# Patient Record
Sex: FEMALE | Race: WHITE | NOT HISPANIC OR LATINO | Employment: FULL TIME | URBAN - NONMETROPOLITAN AREA
[De-identification: names, ages, dates, MRNs, and addresses within clinical notes are randomized per-mention and may not be internally consistent; named-entity substitution may affect disease eponyms.]

---

## 2023-10-06 DIAGNOSIS — R41.840 CONCENTRATION DEFICIT: ICD-10-CM

## 2023-10-06 RX ORDER — DEXTROAMPHETAMINE SULFATE, DEXTROAMPHETAMINE SACCHARATE, AMPHETAMINE SULFATE AND AMPHETAMINE ASPARTATE 7.5; 7.5; 7.5; 7.5 MG/1; MG/1; MG/1; MG/1
30 CAPSULE, EXTENDED RELEASE ORAL EVERY 24 HOURS
COMMUNITY
Start: 2023-05-31 | End: 2023-10-06 | Stop reason: SDUPTHER

## 2023-10-10 RX ORDER — DEXTROAMPHETAMINE SULFATE, DEXTROAMPHETAMINE SACCHARATE, AMPHETAMINE SULFATE AND AMPHETAMINE ASPARTATE 7.5; 7.5; 7.5; 7.5 MG/1; MG/1; MG/1; MG/1
30 CAPSULE, EXTENDED RELEASE ORAL DAILY
Qty: 30 CAPSULE | Refills: 0 | Status: SHIPPED | OUTPATIENT
Start: 2023-10-10 | End: 2023-10-16 | Stop reason: SDUPTHER

## 2023-10-16 PROBLEM — J45.909 ASTHMA (HHS-HCC): Status: ACTIVE | Noted: 2023-10-16

## 2023-10-16 PROBLEM — F41.9 ANXIETY: Status: ACTIVE | Noted: 2023-10-16

## 2023-10-16 PROBLEM — R41.840 ATTENTION AND CONCENTRATION DEFICIT: Status: ACTIVE | Noted: 2023-10-16

## 2023-10-16 PROBLEM — R20.9 SKIN SENSATION DISTURBANCE: Status: ACTIVE | Noted: 2023-10-16

## 2023-10-16 PROBLEM — D72.829 LEUKOCYTOSIS: Status: ACTIVE | Noted: 2023-10-16

## 2023-10-16 RX ORDER — OMEPRAZOLE 20 MG/1
20 TABLET, DELAYED RELEASE ORAL
COMMUNITY
End: 2024-04-16 | Stop reason: SDUPTHER

## 2023-10-16 RX ORDER — LIFITEGRAST 50 MG/ML
SOLUTION/ DROPS OPHTHALMIC
COMMUNITY
Start: 2023-03-21

## 2023-10-16 RX ORDER — ALBUTEROL SULFATE 0.83 MG/ML
SOLUTION RESPIRATORY (INHALATION)
COMMUNITY

## 2023-10-16 RX ORDER — FLUOXETINE 10 MG/1
10 CAPSULE ORAL DAILY
COMMUNITY
Start: 2018-01-08

## 2023-10-17 RX ORDER — DEXTROAMPHETAMINE SACCHARATE, AMPHETAMINE ASPARTATE MONOHYDRATE, DEXTROAMPHETAMINE SULFATE AND AMPHETAMINE SULFATE 7.5; 7.5; 7.5; 7.5 MG/1; MG/1; MG/1; MG/1
30 CAPSULE, EXTENDED RELEASE ORAL DAILY
Qty: 30 CAPSULE | Refills: 0 | Status: SHIPPED | OUTPATIENT
Start: 2023-10-17 | End: 2023-12-27 | Stop reason: SDUPTHER

## 2023-12-27 ENCOUNTER — TELEPHONE (OUTPATIENT)
Dept: PRIMARY CARE | Facility: CLINIC | Age: 53
End: 2023-12-27

## 2023-12-27 DIAGNOSIS — R41.840 CONCENTRATION DEFICIT: ICD-10-CM

## 2024-01-02 RX ORDER — DEXTROAMPHETAMINE SACCHARATE, AMPHETAMINE ASPARTATE MONOHYDRATE, DEXTROAMPHETAMINE SULFATE AND AMPHETAMINE SULFATE 7.5; 7.5; 7.5; 7.5 MG/1; MG/1; MG/1; MG/1
30 CAPSULE, EXTENDED RELEASE ORAL DAILY
Qty: 30 CAPSULE | Refills: 0 | Status: SHIPPED | OUTPATIENT
Start: 2024-01-02 | End: 2024-02-08 | Stop reason: SDUPTHER

## 2024-02-08 DIAGNOSIS — R41.840 CONCENTRATION DEFICIT: ICD-10-CM

## 2024-02-14 RX ORDER — DEXTROAMPHETAMINE SACCHARATE, AMPHETAMINE ASPARTATE MONOHYDRATE, DEXTROAMPHETAMINE SULFATE AND AMPHETAMINE SULFATE 7.5; 7.5; 7.5; 7.5 MG/1; MG/1; MG/1; MG/1
30 CAPSULE, EXTENDED RELEASE ORAL DAILY
Qty: 30 CAPSULE | Refills: 0 | Status: SHIPPED | OUTPATIENT
Start: 2024-02-14 | End: 2024-03-14 | Stop reason: SDUPTHER

## 2024-03-14 DIAGNOSIS — R41.840 CONCENTRATION DEFICIT: ICD-10-CM

## 2024-03-14 RX ORDER — DEXTROAMPHETAMINE SACCHARATE, AMPHETAMINE ASPARTATE MONOHYDRATE, DEXTROAMPHETAMINE SULFATE AND AMPHETAMINE SULFATE 7.5; 7.5; 7.5; 7.5 MG/1; MG/1; MG/1; MG/1
30 CAPSULE, EXTENDED RELEASE ORAL DAILY
Qty: 30 CAPSULE | Refills: 0 | Status: SHIPPED | OUTPATIENT
Start: 2024-03-14 | End: 2024-04-16 | Stop reason: SDUPTHER

## 2024-04-15 ENCOUNTER — APPOINTMENT (OUTPATIENT)
Dept: PRIMARY CARE | Facility: CLINIC | Age: 54
End: 2024-04-15
Payer: COMMERCIAL

## 2024-04-16 ENCOUNTER — LAB (OUTPATIENT)
Dept: LAB | Facility: LAB | Age: 54
End: 2024-04-16
Payer: COMMERCIAL

## 2024-04-16 ENCOUNTER — OFFICE VISIT (OUTPATIENT)
Dept: PRIMARY CARE | Facility: CLINIC | Age: 54
End: 2024-04-16
Payer: COMMERCIAL

## 2024-04-16 VITALS
SYSTOLIC BLOOD PRESSURE: 130 MMHG | WEIGHT: 184.4 LBS | OXYGEN SATURATION: 98 % | BODY MASS INDEX: 27.31 KG/M2 | DIASTOLIC BLOOD PRESSURE: 78 MMHG | HEART RATE: 88 BPM | TEMPERATURE: 98.2 F | HEIGHT: 69 IN

## 2024-04-16 DIAGNOSIS — Z01.818 PREOP EXAMINATION: ICD-10-CM

## 2024-04-16 DIAGNOSIS — K21.9 GASTROESOPHAGEAL REFLUX DISEASE WITHOUT ESOPHAGITIS: Primary | ICD-10-CM

## 2024-04-16 DIAGNOSIS — K21.9 GASTROESOPHAGEAL REFLUX DISEASE WITHOUT ESOPHAGITIS: ICD-10-CM

## 2024-04-16 DIAGNOSIS — R41.840 CONCENTRATION DEFICIT: ICD-10-CM

## 2024-04-16 LAB
ALBUMIN SERPL BCP-MCNC: 4.7 G/DL (ref 3.4–5)
ALP SERPL-CCNC: 101 U/L (ref 33–110)
ALT SERPL W P-5'-P-CCNC: 34 U/L (ref 7–45)
ANION GAP SERPL CALC-SCNC: 12 MMOL/L (ref 10–20)
AST SERPL W P-5'-P-CCNC: 28 U/L (ref 9–39)
BASOPHILS # BLD AUTO: 0.05 X10*3/UL (ref 0–0.1)
BASOPHILS NFR BLD AUTO: 0.6 %
BILIRUB SERPL-MCNC: 0.3 MG/DL (ref 0–1.2)
BUN SERPL-MCNC: 12 MG/DL (ref 6–23)
CALCIUM SERPL-MCNC: 10.3 MG/DL (ref 8.6–10.3)
CHLORIDE SERPL-SCNC: 99 MMOL/L (ref 98–107)
CO2 SERPL-SCNC: 32 MMOL/L (ref 21–32)
CREAT SERPL-MCNC: 0.82 MG/DL (ref 0.5–1.05)
EGFRCR SERPLBLD CKD-EPI 2021: 87 ML/MIN/1.73M*2
EOSINOPHIL # BLD AUTO: 0.09 X10*3/UL (ref 0–0.7)
EOSINOPHIL NFR BLD AUTO: 1.1 %
ERYTHROCYTE [DISTWIDTH] IN BLOOD BY AUTOMATED COUNT: 13.3 % (ref 11.5–14.5)
GLUCOSE SERPL-MCNC: 79 MG/DL (ref 74–99)
HCT VFR BLD AUTO: 42.1 % (ref 36–46)
HGB BLD-MCNC: 14.1 G/DL (ref 12–16)
IMM GRANULOCYTES # BLD AUTO: 0.05 X10*3/UL (ref 0–0.7)
IMM GRANULOCYTES NFR BLD AUTO: 0.6 % (ref 0–0.9)
LYMPHOCYTES # BLD AUTO: 3.07 X10*3/UL (ref 1.2–4.8)
LYMPHOCYTES NFR BLD AUTO: 37.1 %
MCH RBC QN AUTO: 31.8 PG (ref 26–34)
MCHC RBC AUTO-ENTMCNC: 33.5 G/DL (ref 32–36)
MCV RBC AUTO: 95 FL (ref 80–100)
MONOCYTES # BLD AUTO: 0.8 X10*3/UL (ref 0.1–1)
MONOCYTES NFR BLD AUTO: 9.7 %
NEUTROPHILS # BLD AUTO: 4.21 X10*3/UL (ref 1.2–7.7)
NEUTROPHILS NFR BLD AUTO: 50.9 %
NRBC BLD-RTO: 0 /100 WBCS (ref 0–0)
PLATELET # BLD AUTO: 311 X10*3/UL (ref 150–450)
POTASSIUM SERPL-SCNC: 4.5 MMOL/L (ref 3.5–5.3)
PROT SERPL-MCNC: 7.3 G/DL (ref 6.4–8.2)
RBC # BLD AUTO: 4.44 X10*6/UL (ref 4–5.2)
SODIUM SERPL-SCNC: 138 MMOL/L (ref 136–145)
WBC # BLD AUTO: 8.3 X10*3/UL (ref 4.4–11.3)

## 2024-04-16 PROCEDURE — 36415 COLL VENOUS BLD VENIPUNCTURE: CPT

## 2024-04-16 PROCEDURE — 99214 OFFICE O/P EST MOD 30 MIN: CPT | Performed by: FAMILY MEDICINE

## 2024-04-16 RX ORDER — DEXTROAMPHETAMINE SACCHARATE, AMPHETAMINE ASPARTATE MONOHYDRATE, DEXTROAMPHETAMINE SULFATE AND AMPHETAMINE SULFATE 7.5; 7.5; 7.5; 7.5 MG/1; MG/1; MG/1; MG/1
30 CAPSULE, EXTENDED RELEASE ORAL DAILY
Qty: 30 CAPSULE | Refills: 0 | Status: SHIPPED | OUTPATIENT
Start: 2024-04-16 | End: 2024-05-16

## 2024-04-16 RX ORDER — DEXTROAMPHETAMINE SACCHARATE, AMPHETAMINE ASPARTATE MONOHYDRATE, DEXTROAMPHETAMINE SULFATE AND AMPHETAMINE SULFATE 7.5; 7.5; 7.5; 7.5 MG/1; MG/1; MG/1; MG/1
30 CAPSULE, EXTENDED RELEASE ORAL DAILY
Qty: 30 CAPSULE | Refills: 0 | Status: SHIPPED | OUTPATIENT
Start: 2024-05-16 | End: 2024-06-15

## 2024-04-16 RX ORDER — OMEPRAZOLE 20 MG/1
20 TABLET, DELAYED RELEASE ORAL DAILY PRN
Qty: 90 TABLET | Refills: 1 | Status: SHIPPED | OUTPATIENT
Start: 2024-04-16

## 2024-04-16 RX ORDER — DEXTROAMPHETAMINE SACCHARATE, AMPHETAMINE ASPARTATE MONOHYDRATE, DEXTROAMPHETAMINE SULFATE AND AMPHETAMINE SULFATE 7.5; 7.5; 7.5; 7.5 MG/1; MG/1; MG/1; MG/1
30 CAPSULE, EXTENDED RELEASE ORAL DAILY
Qty: 30 CAPSULE | Refills: 0 | Status: SHIPPED | OUTPATIENT
Start: 2024-06-15 | End: 2024-07-15

## 2024-04-16 RX ORDER — FAMOTIDINE 40 MG/1
40 TABLET, FILM COATED ORAL DAILY PRN
Qty: 90 TABLET | Refills: 3 | Status: SHIPPED | OUTPATIENT
Start: 2024-04-16

## 2024-04-16 ASSESSMENT — PATIENT HEALTH QUESTIONNAIRE - PHQ9
2. FEELING DOWN, DEPRESSED OR HOPELESS: NOT AT ALL
1. LITTLE INTEREST OR PLEASURE IN DOING THINGS: NOT AT ALL
SUM OF ALL RESPONSES TO PHQ9 QUESTIONS 1 AND 2: 0

## 2024-04-16 ASSESSMENT — PAIN SCALES - GENERAL: PAINLEVEL: 4

## 2024-05-20 ENCOUNTER — TELEPHONE (OUTPATIENT)
Dept: PRIMARY CARE | Facility: CLINIC | Age: 54
End: 2024-05-20
Payer: COMMERCIAL

## 2024-05-20 NOTE — TELEPHONE ENCOUNTER
patient called for refilled was told to check with pharm patient should still have a refill avail this message created in error

## 2024-06-19 RX ORDER — HYDROCODONE BITARTRATE AND ACETAMINOPHEN 5; 325 MG/1; MG/1
1 TABLET ORAL EVERY 6 HOURS PRN
COMMUNITY
Start: 2024-04-25

## 2024-06-19 RX ORDER — OMEPRAZOLE 20 MG/1
20 CAPSULE, DELAYED RELEASE ORAL
COMMUNITY
Start: 2024-04-16

## 2024-06-19 ASSESSMENT — ENCOUNTER SYMPTOMS
ENDOCRINE NEGATIVE: 1
SHORTNESS OF BREATH: 0
DIARRHEA: 0
DIZZINESS: 0
NAUSEA: 0
DIFFICULTY URINATING: 0
FEVER: 0

## 2024-06-19 NOTE — PROGRESS NOTES
"Subjective   Patient ID: Angélica Daniel is a 53 y.o. female who presents for surgery clearance (Left thumb 4/26 ) and Anxiety (Discuss meds).    Surgery clearance-left thumb on 04/26/2024  Anxiety    Anxiety  Patient reports no chest pain, dizziness, nausea, shortness of breath or suicidal ideas.            Review of Systems   Constitutional:  Negative for fever.        Also see HPI   Eyes:  Negative for visual disturbance.   Respiratory:  Negative for shortness of breath.    Cardiovascular:  Negative for chest pain.   Gastrointestinal:  Negative for diarrhea and nausea.   Endocrine: Negative.    Genitourinary:  Negative for difficulty urinating.   Skin:  Negative for rash.   Neurological:  Negative for dizziness.        No focal deficits   Psychiatric/Behavioral:  Negative for suicidal ideas.    All other systems reviewed and are negative.      Objective   /78   Pulse 88   Temp 36.8 °C (98.2 °F)   Ht 1.753 m (5' 9\")   Wt 83.6 kg (184 lb 6.4 oz)   SpO2 98%   BMI 27.23 kg/m²     Physical Exam  Vitals and nursing note reviewed.   Constitutional:       Appearance: Normal appearance.   HENT:      Head: Normocephalic and atraumatic.   Eyes:      Extraocular Movements: Extraocular movements intact.      Conjunctiva/sclera: Conjunctivae normal.   Cardiovascular:      Rate and Rhythm: Normal rate and regular rhythm.      Heart sounds: Normal heart sounds.   Pulmonary:      Effort: Pulmonary effort is normal.      Breath sounds: Normal breath sounds.      Comments: Lungs essentially CTA b/l  Abdominal:      General: There is no distension.      Palpations: Abdomen is soft. There is no mass.      Tenderness: There is no abdominal tenderness.   Musculoskeletal:      Right lower leg: No edema.      Left lower leg: No edema.   Skin:     Coloration: Skin is not jaundiced.      Findings: No rash.   Neurological:      General: No focal deficit present.      Mental Status: She is alert and oriented to person, place, and " time.   Psychiatric:         Mood and Affect: Mood normal.         Behavior: Behavior normal.         Thought Content: Thought content normal.         Judgment: Judgment normal.         Assessment/Plan   Diagnoses and all orders for this visit:  Gastroesophageal reflux disease without esophagitis  -     omeprazole OTC (PriLOSEC OTC) 20 mg EC tablet; Take 1 tablet (20 mg) by mouth once daily as needed (heartburn).  -     famotidine (Pepcid) 40 mg tablet; Take 1 tablet (40 mg) by mouth once daily as needed for heartburn or indigestion.  -     Comprehensive Metabolic Panel; Future  -     CBC and Auto Differential; Future  Concentration deficit  -     amphetamine-dextroamphetamine XR (Adderall XR) 30 mg 24 hr capsule; Take 1 capsule (30 mg) by mouth once daily.  -     amphetamine-dextroamphetamine XR (Adderall XR) 30 mg 24 hr capsule; Take 1 capsule (30 mg) by mouth once daily. Do not start before May 16, 2024.  -     amphetamine-dextroamphetamine XR (Adderall XR) 30 mg 24 hr capsule; Take 1 capsule (30 mg) by mouth once daily. Do not start before Katie 15, 2024.  Preop examination  -     Comprehensive Metabolic Panel; Future  -     CBC and Auto Differential; Future

## 2024-07-17 ENCOUNTER — TELEPHONE (OUTPATIENT)
Dept: PRIMARY CARE | Facility: CLINIC | Age: 54
End: 2024-07-17
Payer: COMMERCIAL

## 2024-07-17 DIAGNOSIS — R41.840 CONCENTRATION DEFICIT: ICD-10-CM

## 2024-07-19 RX ORDER — DEXTROAMPHETAMINE SACCHARATE, AMPHETAMINE ASPARTATE MONOHYDRATE, DEXTROAMPHETAMINE SULFATE AND AMPHETAMINE SULFATE 7.5; 7.5; 7.5; 7.5 MG/1; MG/1; MG/1; MG/1
30 CAPSULE, EXTENDED RELEASE ORAL DAILY
Qty: 30 CAPSULE | Refills: 0 | Status: SHIPPED | OUTPATIENT
Start: 2024-09-17 | End: 2024-10-17

## 2024-07-19 RX ORDER — DEXTROAMPHETAMINE SACCHARATE, AMPHETAMINE ASPARTATE MONOHYDRATE, DEXTROAMPHETAMINE SULFATE AND AMPHETAMINE SULFATE 7.5; 7.5; 7.5; 7.5 MG/1; MG/1; MG/1; MG/1
30 CAPSULE, EXTENDED RELEASE ORAL DAILY
Qty: 30 CAPSULE | Refills: 0 | Status: SHIPPED | OUTPATIENT
Start: 2024-08-18 | End: 2024-09-17

## 2024-07-19 RX ORDER — DEXTROAMPHETAMINE SACCHARATE, AMPHETAMINE ASPARTATE MONOHYDRATE, DEXTROAMPHETAMINE SULFATE AND AMPHETAMINE SULFATE 7.5; 7.5; 7.5; 7.5 MG/1; MG/1; MG/1; MG/1
30 CAPSULE, EXTENDED RELEASE ORAL DAILY
Qty: 30 CAPSULE | Refills: 0 | Status: SHIPPED | OUTPATIENT
Start: 2024-07-19 | End: 2024-08-18

## 2024-10-02 ENCOUNTER — OFFICE VISIT (OUTPATIENT)
Dept: PRIMARY CARE | Facility: CLINIC | Age: 54
End: 2024-10-02
Payer: COMMERCIAL

## 2024-10-02 VITALS
DIASTOLIC BLOOD PRESSURE: 88 MMHG | SYSTOLIC BLOOD PRESSURE: 140 MMHG | TEMPERATURE: 98.1 F | WEIGHT: 182 LBS | BODY MASS INDEX: 26.96 KG/M2 | HEART RATE: 88 BPM | OXYGEN SATURATION: 97 % | HEIGHT: 69 IN

## 2024-10-02 DIAGNOSIS — L08.9 SKIN INFECTION: ICD-10-CM

## 2024-10-02 DIAGNOSIS — R41.840 CONCENTRATION DEFICIT: ICD-10-CM

## 2024-10-02 DIAGNOSIS — Z12.31 ENCOUNTER FOR SCREENING MAMMOGRAM FOR BREAST CANCER: ICD-10-CM

## 2024-10-02 DIAGNOSIS — L30.9 DERMATITIS: Primary | ICD-10-CM

## 2024-10-02 DIAGNOSIS — R41.840 ATTENTION AND CONCENTRATION DEFICIT: ICD-10-CM

## 2024-10-02 PROCEDURE — 3008F BODY MASS INDEX DOCD: CPT | Performed by: FAMILY MEDICINE

## 2024-10-02 PROCEDURE — 99214 OFFICE O/P EST MOD 30 MIN: CPT | Performed by: FAMILY MEDICINE

## 2024-10-02 RX ORDER — DEXTROAMPHETAMINE SACCHARATE, AMPHETAMINE ASPARTATE MONOHYDRATE, DEXTROAMPHETAMINE SULFATE AND AMPHETAMINE SULFATE 7.5; 7.5; 7.5; 7.5 MG/1; MG/1; MG/1; MG/1
30 CAPSULE, EXTENDED RELEASE ORAL DAILY
Qty: 30 CAPSULE | Refills: 0 | Status: SHIPPED | OUTPATIENT
Start: 2024-11-20 | End: 2024-12-20

## 2024-10-02 RX ORDER — DEXTROAMPHETAMINE SACCHARATE, AMPHETAMINE ASPARTATE MONOHYDRATE, DEXTROAMPHETAMINE SULFATE AND AMPHETAMINE SULFATE 7.5; 7.5; 7.5; 7.5 MG/1; MG/1; MG/1; MG/1
30 CAPSULE, EXTENDED RELEASE ORAL DAILY
Qty: 30 CAPSULE | Refills: 0 | Status: SHIPPED | OUTPATIENT
Start: 2024-10-21 | End: 2024-11-20

## 2024-10-02 RX ORDER — DOXYCYCLINE 100 MG/1
100 CAPSULE ORAL 2 TIMES DAILY
Qty: 20 CAPSULE | Refills: 0 | Status: SHIPPED | OUTPATIENT
Start: 2024-10-02 | End: 2024-10-12

## 2024-10-02 RX ORDER — PREDNISONE 10 MG/1
TABLET ORAL
Qty: 21 TABLET | Refills: 0 | Status: SHIPPED | OUTPATIENT
Start: 2024-10-02 | End: 2024-10-07

## 2024-10-02 RX ORDER — DEXTROAMPHETAMINE SACCHARATE, AMPHETAMINE ASPARTATE MONOHYDRATE, DEXTROAMPHETAMINE SULFATE AND AMPHETAMINE SULFATE 7.5; 7.5; 7.5; 7.5 MG/1; MG/1; MG/1; MG/1
30 CAPSULE, EXTENDED RELEASE ORAL DAILY
Qty: 30 CAPSULE | Refills: 0 | Status: SHIPPED | OUTPATIENT
Start: 2024-12-20 | End: 2025-01-19

## 2024-10-02 ASSESSMENT — PATIENT HEALTH QUESTIONNAIRE - PHQ9
SUM OF ALL RESPONSES TO PHQ9 QUESTIONS 1 AND 2: 0
2. FEELING DOWN, DEPRESSED OR HOPELESS: NOT AT ALL
1. LITTLE INTEREST OR PLEASURE IN DOING THINGS: NOT AT ALL

## 2024-10-02 ASSESSMENT — PAIN SCALES - GENERAL: PAINLEVEL: 0-NO PAIN

## 2024-10-09 ENCOUNTER — HOSPITAL ENCOUNTER (OUTPATIENT)
Dept: RADIOLOGY | Facility: HOSPITAL | Age: 54
Discharge: HOME | End: 2024-10-09
Payer: COMMERCIAL

## 2024-10-09 VITALS — HEIGHT: 69 IN | WEIGHT: 180 LBS | BODY MASS INDEX: 26.66 KG/M2

## 2024-10-09 DIAGNOSIS — Z12.31 ENCOUNTER FOR SCREENING MAMMOGRAM FOR BREAST CANCER: ICD-10-CM

## 2024-10-09 PROCEDURE — 77063 BREAST TOMOSYNTHESIS BI: CPT

## 2024-10-09 PROCEDURE — 77067 SCR MAMMO BI INCL CAD: CPT | Performed by: RADIOLOGY

## 2024-10-09 PROCEDURE — 77063 BREAST TOMOSYNTHESIS BI: CPT | Performed by: RADIOLOGY

## 2024-10-10 DIAGNOSIS — K21.9 GASTROESOPHAGEAL REFLUX DISEASE WITHOUT ESOPHAGITIS: Primary | ICD-10-CM

## 2024-10-14 ENCOUNTER — HOSPITAL ENCOUNTER (OUTPATIENT)
Dept: RADIOLOGY | Facility: HOSPITAL | Age: 54
Discharge: HOME | End: 2024-10-14
Payer: COMMERCIAL

## 2024-10-14 DIAGNOSIS — R92.8 ABNORMAL MAMMOGRAM: ICD-10-CM

## 2024-10-14 PROCEDURE — 76642 ULTRASOUND BREAST LIMITED: CPT | Mod: LEFT SIDE | Performed by: RADIOLOGY

## 2024-10-14 PROCEDURE — 76642 ULTRASOUND BREAST LIMITED: CPT | Mod: LT

## 2024-10-21 RX ORDER — OMEPRAZOLE 20 MG/1
CAPSULE, DELAYED RELEASE ORAL
Qty: 90 CAPSULE | Refills: 0 | Status: SHIPPED | OUTPATIENT
Start: 2024-10-21

## 2024-11-26 ENCOUNTER — OFFICE VISIT (OUTPATIENT)
Dept: URGENT CARE | Age: 54
End: 2024-11-26
Payer: COMMERCIAL

## 2024-11-26 VITALS
WEIGHT: 182.32 LBS | HEART RATE: 84 BPM | SYSTOLIC BLOOD PRESSURE: 133 MMHG | RESPIRATION RATE: 18 BRPM | BODY MASS INDEX: 26.92 KG/M2 | DIASTOLIC BLOOD PRESSURE: 82 MMHG | TEMPERATURE: 98.2 F | OXYGEN SATURATION: 100 %

## 2024-11-26 DIAGNOSIS — J01.40 ACUTE NON-RECURRENT PANSINUSITIS: Primary | ICD-10-CM

## 2024-11-26 DIAGNOSIS — J06.9 URI, ACUTE: ICD-10-CM

## 2024-11-26 RX ORDER — AMOXICILLIN AND CLAVULANATE POTASSIUM 875; 125 MG/1; MG/1
1 TABLET, FILM COATED ORAL 2 TIMES DAILY
Qty: 20 TABLET | Refills: 0 | Status: SHIPPED | OUTPATIENT
Start: 2024-11-26 | End: 2024-12-06

## 2024-11-26 RX ORDER — FLUTICASONE PROPIONATE 50 MCG
1 SPRAY, SUSPENSION (ML) NASAL DAILY
Qty: 16 G | Refills: 0 | Status: SHIPPED | OUTPATIENT
Start: 2024-11-26 | End: 2024-12-10

## 2024-11-26 NOTE — PATIENT INSTRUCTIONS
Please take medications as prescribed.  You can use DayQuil/NyQuil to help with pain/cough, use cough drops and honey as well to soothe throat and help with the cough, stay hydrated.  If symptoms worsen or do not improve return to the clinic or go to the emergency department.  Follow-up with primary care provider in 1 to 2 weeks.

## 2024-11-26 NOTE — PROGRESS NOTES
Subjective   Patient ID: Angélica Daniel is a 54 y.o. female. They present today with a chief complaint of Nasal Congestion, Cough, and Sinus Problem (3 days of symptoms, 2 at home covid test negative).    History of Present Illness  54-year-old female presents to urgent care for complaint of sinus pressure/congestion/postnasal drip causing cough.  States the nasal discharge is becoming more thick and discolored.  States he has history of sinus infections and states this feels exactly like prior.  Denies any current fevers or chills, nausea vomiting or sweats, chest pain or shortness of breath, abdominal pain, ear pain, sore throat.  Prescribed Augmentin and Flonase, educated on supportive care, follow-up PCP 1 to 2 weeks, return/ER precautions.  Patient agrees with plan.          Past Medical History  Allergies as of 11/26/2024 - Reviewed 11/26/2024   Allergen Reaction Noted    Sulfamethoxazole-trimethoprim Hives and Unknown 02/08/2024    Codeine Itching and Unknown 07/05/2011    Other Swelling 06/19/2024    Nicotine Unknown 02/08/2024    Nicotine (polacrilex) Rash 02/08/2024    Sulfa (sulfonamide antibiotics) Hives, Itching, and Rash 07/05/2011       (Not in a hospital admission)       Past Medical History:   Diagnosis Date    ADHD     Anxiety        Past Surgical History:   Procedure Laterality Date    TRIGGER FINGER RELEASE Left 02/2024    thumb        reports that she has been smoking cigarettes. She has a 17.5 pack-year smoking history. She has never used smokeless tobacco. She reports current alcohol use of about 1.0 standard drink of alcohol per week. She reports that she does not use drugs.    Review of Systems  Review of Systems   All other systems reviewed and are negative.                                 Objective    Vitals:    11/26/24 0818   BP: 133/82   BP Location: Left arm   Patient Position: Sitting   BP Cuff Size: Adult   Pulse: 84   Resp: 18   Temp: 36.8 °C (98.2 °F)   TempSrc: Oral   SpO2: 100%    Weight: 82.7 kg (182 lb 5.1 oz)     No LMP recorded. Patient has had a hysterectomy.    Physical Exam  Vitals reviewed.   Constitutional:       General: She is not in acute distress.     Appearance: Normal appearance. She is not ill-appearing, toxic-appearing or diaphoretic.   HENT:      Head: Normocephalic and atraumatic.      Comments: Frontal and maxillary sinus tenderness.     Right Ear: Tympanic membrane, ear canal and external ear normal.      Left Ear: Tympanic membrane, ear canal and external ear normal.      Nose: Congestion and rhinorrhea present.      Mouth/Throat:      Mouth: Mucous membranes are moist.      Comments: Pharynx mildly erythematous without exudate.  Postnasal drip.  Uvula midline normal.  Cardiovascular:      Rate and Rhythm: Normal rate and regular rhythm.   Pulmonary:      Effort: Pulmonary effort is normal. No respiratory distress.      Breath sounds: Normal breath sounds. No stridor. No wheezing, rhonchi or rales.   Abdominal:      General: Abdomen is flat.      Palpations: Abdomen is soft.      Tenderness: There is no abdominal tenderness. There is no right CVA tenderness or left CVA tenderness.   Musculoskeletal:      Cervical back: Normal range of motion and neck supple. No rigidity or tenderness.   Lymphadenopathy:      Cervical: No cervical adenopathy.   Skin:     General: Skin is warm and dry.   Neurological:      General: No focal deficit present.      Mental Status: She is alert and oriented to person, place, and time.   Psychiatric:         Mood and Affect: Mood normal.         Behavior: Behavior normal.         Procedures    Point of Care Test & Imaging Results from this visit  No results found for this visit on 11/26/24.   No results found.    Diagnostic study results (if any) were reviewed by Milagro Martinez PA-C.    Assessment/Plan   Allergies, medications, history, and pertinent labs/EKGs/Imaging reviewed by Milagro Martinez PA-C.     Medical Decision  Making  54-year-old female presents to urgent care for complaint of sinus pressure/congestion/postnasal drip causing cough.  States the nasal discharge is becoming more thick and discolored.  States he has history of sinus infections and states this feels exactly like prior.  Denies any current fevers or chills, nausea vomiting or sweats, chest pain or shortness of breath, abdominal pain, ear pain, sore throat.  Prescribed Augmentin and Flonase, educated on supportive care, follow-up PCP 1 to 2 weeks, return/ER precautions.  Patient agrees with plan.    Orders and Diagnoses  There are no diagnoses linked to this encounter.    Medical Admin Record      Patient disposition: Home    Electronically signed by Milagro Martinez PA-C  8:33 AM

## 2024-12-05 ENCOUNTER — OFFICE VISIT (OUTPATIENT)
Dept: URGENT CARE | Age: 54
End: 2024-12-05
Payer: COMMERCIAL

## 2024-12-05 DIAGNOSIS — J98.8 RESPIRATORY TRACT INFECTION: Primary | ICD-10-CM

## 2024-12-05 RX ORDER — ALBUTEROL SULFATE 90 UG/1
2 INHALANT RESPIRATORY (INHALATION) EVERY 6 HOURS PRN
Qty: 18 G | Refills: 0 | Status: SHIPPED | OUTPATIENT
Start: 2024-12-05 | End: 2025-12-05

## 2024-12-05 RX ORDER — PREDNISONE 20 MG/1
20 TABLET ORAL DAILY
Qty: 10 TABLET | Refills: 0 | Status: SHIPPED | OUTPATIENT
Start: 2024-12-05 | End: 2024-12-15

## 2024-12-05 RX ORDER — AZITHROMYCIN 250 MG/1
TABLET, FILM COATED ORAL
Qty: 6 TABLET | Refills: 0 | Status: SHIPPED | OUTPATIENT
Start: 2024-12-05

## 2024-12-05 RX ORDER — FLUCONAZOLE 150 MG/1
150 TABLET ORAL ONCE
Qty: 1 TABLET | Refills: 0 | Status: SHIPPED | OUTPATIENT
Start: 2024-12-05 | End: 2024-12-05

## 2024-12-05 NOTE — PATIENT INSTRUCTIONS
Thank you for choosing \[facility]\"" Telehealth.    You were seen for prolonged and worsening cold like symptoms.  You most likely have a bacterial respiratory tract infection.  I recommend supportive therapy with the following medications below.    URI  1.  Please take *azithromycin* as prescribed  2.  Use Tea and honey for cough. This is known to work better than most OTC medications.  3.  Use pseudoephedrine Bromphen as prescribed  4.  Stay well-hydrated and drink lots of fluids.  5.  Follow up with your primary care physician in the next 2-3 days  6.  Return to Ed or Urgent Care if symptoms worsen or new symptoms develop.    Based on clinical exam findings and history you most likely have a upper respiratory tract infection.  Your initial illness was likely caused by a virus that progressed to a secondary bacterial infection.  You are contagious as soon as the symptoms start.  Your symptoms may persist up to 2-3 weeks.  Symptoms usually peak by days 3 or 5.  Typical symptoms include nasal congestion, a runny nose, scratchy throat, cough, and irritability. The diagnosis is based on symptoms. Good hand hygiene is the best way to prevent these infections, and routine vaccination can help prevent influenza. Treatment aims to relieve symptoms. Rest and fluids. Tylenol and/or ibuprofen for fever and pain. Over the counter decongestants or mucolytics.

## 2024-12-05 NOTE — PROGRESS NOTES
that worked some things but was wrong with it urgent Care Virtual Video Visit    Patient Location:  [place]  Provider Location: [facility]    Video visit completed with realtime synchronous video/audio connection. Informed consent was obtained from the patient. Patient was made aware that my evaluation and diagnosis are limited due to the fact that we are not in the same room during the interview and that this is a virtual encounter that took place via videoconferencing. Patient verbalized understanding.       Subjective   Patient ID: Angélica Daniel is a 54 y.o. female. They present today with a chief complaint of No chief complaint on file..    History of Present Illness    HPI    54-year-old female presenting for URI symptoms that have not improved despite taking Augmentin.  Patient reports that she was prescribed Augmentin on 11/26.  Symptoms initially started on 1122.  Cough is semiproductive and has associated chest congestion.  Patient continues to have a runny and stuffy nose.  No fever or chills or myalgias.  No abdominal pain, nausea, vomiting.  No lip or tongue swelling.  No history of clots or clotting disorders.  No reported leg swelling or pain.  Does take albuterol.  Never hospitalized for asthma.      Past Medical History  Allergies as of 12/05/2024 - Reviewed 11/26/2024   Allergen Reaction Noted    Sulfamethoxazole-trimethoprim Hives and Unknown 02/08/2024    Codeine Itching and Unknown 07/05/2011    Other Swelling 06/19/2024    Nicotine Unknown 02/08/2024    Nicotine (polacrilex) Rash 02/08/2024    Sulfa (sulfonamide antibiotics) Hives, Itching, and Rash 07/05/2011       (Not in a hospital admission)         Past Medical History:   Diagnosis Date    ADHD     Anxiety        Past Surgical History:   Procedure Laterality Date    TRIGGER FINGER RELEASE Left 02/2024    thumb        reports that she has been smoking cigarettes. She has a 17.5 pack-year smoking history. She has never used  smokeless tobacco. She reports current alcohol use of about 1.0 standard drink of alcohol per week. She reports that she does not use drugs.    Review of Systems  Review of Systems    After reviewing all body systems I have documented pertinent findings above in the history.  All other Systems reviewed and are negative for complaint.  Pertinent positive and negatives are listed in the above HPI.        Objective    There were no vitals filed for this visit.  No LMP recorded. Patient has had a hysterectomy.    Physical Exam    Constitutional: Well-developed, well-nourished.  Alert.  Overall well-appearing.  Head and face: Normal  Mouth: No lip or tongue swelling.  No trismus.  Neck: Neck is supple.  Pulmonary: No respiratory distress.       Assessment/Plan   Allergies, medications, history, and pertinent labs/EKGs/Imaging reviewed by Alexandru Zabala PA-C.     MDM:  An interactive audio and visual telecommunication system which permits real-time communication between the patient and provider was utilized to provide this telehealth service.    Patient presenting via telehealth for URI type symptoms.   No relief despite taking over-the-counter medications.      Patient does not appear toxic. No evidence of acute distress or respiratory compromise.  No tripoding. No nasal flaring.  Speaks in complete sentences.  Due to symptom onset/length, progression of worsening symptoms a bacterial URI is considered the most likely cause.  Concern for walking pneumonia.  Through shared decision making the patient was prescribed antimicrobial therapy. Advised supportive therapy with over the counter medication and good follow up. Patient was instructed to follow-up with his PCP in the next 2-3 days.  Patient was advised to be seen in person at a local urgent care or emergency department.        Patient disposition: Home    Electronically signed by Alexandru Zabala PA-C  11:28 AM    2-3

## 2024-12-08 ASSESSMENT — ENCOUNTER SYMPTOMS
DIFFICULTY URINATING: 0
DIZZINESS: 0
ENDOCRINE NEGATIVE: 1
FEVER: 0
SHORTNESS OF BREATH: 0
DIARRHEA: 0
NAUSEA: 0

## 2024-12-09 ENCOUNTER — OFFICE VISIT (OUTPATIENT)
Dept: URGENT CARE | Age: 54
End: 2024-12-09
Payer: COMMERCIAL

## 2024-12-09 ENCOUNTER — ANCILLARY PROCEDURE (OUTPATIENT)
Dept: URGENT CARE | Age: 54
End: 2024-12-09
Payer: COMMERCIAL

## 2024-12-09 VITALS
HEART RATE: 78 BPM | TEMPERATURE: 98.1 F | BODY MASS INDEX: 26.84 KG/M2 | DIASTOLIC BLOOD PRESSURE: 84 MMHG | HEIGHT: 69 IN | OXYGEN SATURATION: 95 % | RESPIRATION RATE: 24 BRPM | SYSTOLIC BLOOD PRESSURE: 137 MMHG | WEIGHT: 181.22 LBS

## 2024-12-09 DIAGNOSIS — R06.2 WHEEZING: ICD-10-CM

## 2024-12-09 DIAGNOSIS — R05.9 COUGH, UNSPECIFIED TYPE: ICD-10-CM

## 2024-12-09 DIAGNOSIS — J01.90 ACUTE NON-RECURRENT SINUSITIS, UNSPECIFIED LOCATION: Primary | ICD-10-CM

## 2024-12-09 PROCEDURE — 71046 X-RAY EXAM CHEST 2 VIEWS: CPT

## 2024-12-09 PROCEDURE — 3008F BODY MASS INDEX DOCD: CPT

## 2024-12-09 PROCEDURE — 99070 SPECIAL SUPPLIES PHYS/QHP: CPT

## 2024-12-09 PROCEDURE — 99213 OFFICE O/P EST LOW 20 MIN: CPT

## 2024-12-09 RX ORDER — BENZONATATE 200 MG/1
200 CAPSULE ORAL 3 TIMES DAILY PRN
Qty: 30 CAPSULE | Refills: 0 | Status: SHIPPED | OUTPATIENT
Start: 2024-12-09 | End: 2024-12-16

## 2024-12-09 RX ORDER — ALBUTEROL SULFATE 0.83 MG/ML
2.5 SOLUTION RESPIRATORY (INHALATION) EVERY 6 HOURS PRN
Qty: 75 ML | Refills: 0 | Status: SHIPPED | OUTPATIENT
Start: 2024-12-09 | End: 2025-12-09

## 2024-12-09 RX ORDER — DOXYCYCLINE 100 MG/1
100 CAPSULE ORAL 2 TIMES DAILY
Qty: 20 CAPSULE | Refills: 0 | Status: SHIPPED | OUTPATIENT
Start: 2024-12-09 | End: 2024-12-19

## 2024-12-09 NOTE — PROGRESS NOTES
"Subjective   Patient ID: Angélica Daniel is a 54 y.o. female who presents for ADHD and Rash (Right arm on tatoo).    Skin irritation, skin redness some warmth near the tattoo area on the right arm  ADD on medication, OARRS reviewed, okay  Mammogram ordered    ADHD  Associated symptoms include a rash. Pertinent negatives include no chest pain, fever or nausea.   Rash  Pertinent negatives include no diarrhea, fever or shortness of breath.        Review of Systems   Constitutional:  Negative for fever.        Also see HPI   Eyes:  Negative for visual disturbance.   Respiratory:  Negative for shortness of breath.    Cardiovascular:  Negative for chest pain.   Gastrointestinal:  Negative for diarrhea and nausea.   Endocrine: Negative.    Genitourinary:  Negative for difficulty urinating.   Skin:  Positive for rash.   Neurological:  Negative for dizziness.        No focal deficits   Psychiatric/Behavioral:  Negative for suicidal ideas.    All other systems reviewed and are negative.      Objective   /88   Pulse 88   Temp 36.7 °C (98.1 °F)   Ht 1.753 m (5' 9\")   Wt 82.6 kg (182 lb)   SpO2 97%   BMI 26.88 kg/m²     Physical Exam  Vitals and nursing note reviewed.   Constitutional:       Appearance: Normal appearance.   HENT:      Head: Normocephalic and atraumatic.   Eyes:      Conjunctiva/sclera: Conjunctivae normal.   Cardiovascular:      Rate and Rhythm: Normal rate and regular rhythm.      Heart sounds: Normal heart sounds.   Pulmonary:      Effort: Pulmonary effort is normal.      Breath sounds: Normal breath sounds.   Skin:     Comments: Redness and irritation of the right arm near the tattoo, consistent possible cellulitis, skin infection   Neurological:      Mental Status: She is oriented to person, place, and time.   Psychiatric:         Mood and Affect: Mood normal.         Behavior: Behavior normal.         Assessment/Plan   Diagnoses and all orders for this visit:  Dermatitis  -     predniSONE " (Deltasone) 10 mg tablet; Take 6 tablets (60 mg) by mouth once daily for 1 day, THEN 5 tablets (50 mg) once daily for 1 day, THEN 4 tablets (40 mg) once daily for 1 day, THEN 3 tablets (30 mg) once daily for 1 day, THEN 2 tablets (20 mg) once daily for 1 day, THEN 1 tablet (10 mg) once daily for 1 day.  Attention and concentration deficit  -     Follow Up In Primary Care - Established; Future  Skin infection  -     doxycycline (Vibramycin) 100 mg capsule; Take 1 capsule (100 mg) by mouth 2 times a day for 10 days. Take with at least 8 ounces (large glass) of water, do not lie down for 30 minutes after  Concentration deficit  -     amphetamine-dextroamphetamine XR (Adderall XR) 30 mg 24 hr capsule; Take 1 capsule (30 mg) by mouth once daily. Do not fill before October 21, 2024.  -     amphetamine-dextroamphetamine XR (Adderall XR) 30 mg 24 hr capsule; Take 1 capsule (30 mg) by mouth once daily. Do not fill before November 20, 2024.  -     amphetamine-dextroamphetamine XR (Adderall XR) 30 mg 24 hr capsule; Take 1 capsule (30 mg) by mouth once daily. Do not fill before December 20, 2024.  Encounter for screening mammogram for breast cancer  -     BI mammo bilateral screening tomosynthesis; Future

## 2024-12-09 NOTE — PATIENT INSTRUCTIONS
Take medications as prescribed.  Take your final doses of your steroid.  Maintain adequate hydration nutrition.  Follow-up PCP in 1 to 2 weeks.  If symptoms do not improve, worsen or any new symptoms develop please go to emergency department.

## 2024-12-09 NOTE — PROGRESS NOTES
"Subjective   Patient ID: Angélica Daniel is a 54 y.o. female. They present today with a chief complaint of Cough (PT prescribed prednisone, z pack, mucinex and inhaler on virtual visit 12/05/2024//Started 11/22/24), RIGHT rib pain (From coughing hard), and nasal drainage.    History of Present Illness  54-year-old female presents urgent care for complaint of cough for past several weeks.  States she recently was prescribed Augmentin for sinus infection and also was prescribed a Z-Demetrius, prednisone, Mucinex, and inhaler on a virtual visit recently as well.  States she does 2 days left of prednisone.  States the cough started and hurt her right lower leg.  Radiologist findings on the chest x-ray stated \"The heart and mediastinum are normal.  Mild prominence of the interstitial markings is noted, however there  are no confluence infiltrates. No pleural effusions are seen.  The osseous structures are unchanged.  Bilateral breast implants are in position.\".  States she still has sinus pressure/congestion and postnasal drip.  Prescribed doxycycline and Tessalon Perles.  Patient states her nebulizer vials also need refilled.  Educated to finish prednisone prescription.  Offered lidocaine patch for rib pain from cough and patient states she already has lidocaine patch.  Educated on supportive care.  Follow-up PCP in 1 to 2 weeks.  ER precautions.  Patient agrees with plan.            Past Medical History  Allergies as of 12/09/2024 - Reviewed 12/09/2024   Allergen Reaction Noted    Sulfamethoxazole-trimethoprim Hives and Unknown 02/08/2024    Codeine Itching and Unknown 07/05/2011    Other Swelling 06/19/2024    Nicotine Unknown 02/08/2024    Nicotine (polacrilex) Rash 02/08/2024    Sulfa (sulfonamide antibiotics) Hives, Itching, and Rash 07/05/2011       (Not in a hospital admission)       Past Medical History:   Diagnosis Date    ADHD     Anxiety        Past Surgical History:   Procedure Laterality Date    TRIGGER FINGER " "RELEASE Left 02/2024    thumb        reports that she has been smoking cigarettes. She has a 17.5 pack-year smoking history. She has never used smokeless tobacco. She reports current alcohol use of about 1.0 standard drink of alcohol per week. She reports that she does not use drugs.    Review of Systems  Review of Systems   All other systems reviewed and are negative.                                 Objective    Vitals:    12/09/24 0829   BP: 137/84   Pulse: 78   Resp: 24   Temp: 36.7 °C (98.1 °F)   TempSrc: Oral   SpO2: 95%   Weight: 82.2 kg (181 lb 3.5 oz)   Height: 1.753 m (5' 9\")     No LMP recorded. Patient has had a hysterectomy.    Physical Exam  Vitals reviewed.   Constitutional:       General: She is not in acute distress.     Appearance: Normal appearance. She is not ill-appearing, toxic-appearing or diaphoretic.   HENT:      Head: Normocephalic and atraumatic.      Comments: Frontal and maxillary sinus pressure/congestion.     Right Ear: Tympanic membrane, ear canal and external ear normal.      Left Ear: Tympanic membrane, ear canal and external ear normal.      Nose: Congestion present.      Mouth/Throat:      Mouth: Mucous membranes are moist.      Comments: Pharynx mildly erythematous without exudate.  Uvula midline.  Postnasal drip.  Cardiovascular:      Rate and Rhythm: Normal rate and regular rhythm.   Pulmonary:      Effort: Pulmonary effort is normal. No respiratory distress.      Breath sounds: No stridor.      Comments: Mild wheeze and crackles bilateral upper and lower lobes greatest on the right lower lobe.  No crepitus to palpation of the ribs.  Abdominal:      General: Abdomen is flat.      Palpations: Abdomen is soft.      Tenderness: There is no abdominal tenderness. There is no right CVA tenderness or left CVA tenderness.   Musculoskeletal:      Cervical back: Normal range of motion and neck supple. No rigidity or tenderness.   Lymphadenopathy:      Cervical: No cervical adenopathy. " "  Skin:     General: Skin is warm and dry.   Neurological:      General: No focal deficit present.      Mental Status: She is alert and oriented to person, place, and time.   Psychiatric:         Mood and Affect: Mood normal.         Behavior: Behavior normal.         Procedures    Point of Care Test & Imaging Results from this visit  No results found for this visit on 12/09/24.   No results found.    Diagnostic study results (if any) were reviewed by Milagro Martinez PA-C.    Assessment/Plan   Allergies, medications, history, and pertinent labs/EKGs/Imaging reviewed by Milagro Martinez PA-C.     Medical Decision Making  54-year-old female presents urgent care for complaint of cough for past several weeks.  States she recently was prescribed Augmentin for sinus infection and also was prescribed a Z-Demetrius, prednisone, Mucinex, and inhaler on a virtual visit recently as well.  States she does 2 days left of prednisone.  States the cough started and hurt her right lower leg.  Radiologist findings on the chest x-ray stated \"The heart and mediastinum are normal.  Mild prominence of the interstitial markings is noted, however there  are no confluence infiltrates. No pleural effusions are seen.  The osseous structures are unchanged.  Bilateral breast implants are in position.\".  States she still has sinus pressure/congestion and postnasal drip.  Prescribed doxycycline and Tessalon Perles.  Patient states her nebulizer vials also need refilled.  Educated to finish prednisone prescription.  Offered lidocaine patch for rib pain from cough and patient states she already has lidocaine patch.  Educated on supportive care.  Follow-up PCP in 1 to 2 weeks.  ER precautions.  Patient agrees with plan.    Orders and Diagnoses  There are no diagnoses linked to this encounter.    Medical Admin Record      Patient disposition: Home    Electronically signed by Milagro Martinez PA-C  8:36 AM      "

## 2024-12-11 ENCOUNTER — APPOINTMENT (OUTPATIENT)
Dept: RADIOLOGY | Facility: HOSPITAL | Age: 54
End: 2024-12-11
Payer: COMMERCIAL

## 2024-12-11 ENCOUNTER — HOSPITAL ENCOUNTER (EMERGENCY)
Facility: HOSPITAL | Age: 54
Discharge: HOME | End: 2024-12-11
Payer: COMMERCIAL

## 2024-12-11 VITALS
TEMPERATURE: 97.9 F | HEART RATE: 71 BPM | RESPIRATION RATE: 16 BRPM | SYSTOLIC BLOOD PRESSURE: 128 MMHG | BODY MASS INDEX: 26.66 KG/M2 | HEIGHT: 69 IN | WEIGHT: 180 LBS | OXYGEN SATURATION: 97 % | DIASTOLIC BLOOD PRESSURE: 82 MMHG

## 2024-12-11 DIAGNOSIS — B37.9 YEAST INFECTION: ICD-10-CM

## 2024-12-11 DIAGNOSIS — M62.838 MUSCLE SPASM: ICD-10-CM

## 2024-12-11 DIAGNOSIS — S29.011A MUSCLE STRAIN OF CHEST WALL, INITIAL ENCOUNTER: Primary | ICD-10-CM

## 2024-12-11 PROCEDURE — 2500000004 HC RX 250 GENERAL PHARMACY W/ HCPCS (ALT 636 FOR OP/ED): Performed by: PHYSICIAN ASSISTANT

## 2024-12-11 PROCEDURE — 74176 CT ABD & PELVIS W/O CONTRAST: CPT | Performed by: RADIOLOGY

## 2024-12-11 PROCEDURE — 71250 CT THORAX DX C-: CPT | Performed by: RADIOLOGY

## 2024-12-11 PROCEDURE — 96372 THER/PROPH/DIAG INJ SC/IM: CPT | Performed by: PHYSICIAN ASSISTANT

## 2024-12-11 PROCEDURE — 99284 EMERGENCY DEPT VISIT MOD MDM: CPT | Mod: 25

## 2024-12-11 PROCEDURE — 74176 CT ABD & PELVIS W/O CONTRAST: CPT

## 2024-12-11 RX ORDER — CYCLOBENZAPRINE HCL 10 MG
10 TABLET ORAL 3 TIMES DAILY PRN
Qty: 15 TABLET | Refills: 0 | Status: SHIPPED | OUTPATIENT
Start: 2024-12-11 | End: 2024-12-16

## 2024-12-11 RX ORDER — FLUCONAZOLE 150 MG/1
TABLET ORAL
Qty: 2 TABLET | Refills: 0 | Status: SHIPPED | OUTPATIENT
Start: 2024-12-11

## 2024-12-11 RX ORDER — LIDOCAINE 50 MG/G
1 PATCH TOPICAL DAILY
Qty: 7 PATCH | Refills: 0 | Status: SHIPPED | OUTPATIENT
Start: 2024-12-11 | End: 2024-12-11

## 2024-12-11 RX ORDER — ORPHENADRINE CITRATE 30 MG/ML
60 INJECTION INTRAMUSCULAR; INTRAVENOUS ONCE
Status: COMPLETED | OUTPATIENT
Start: 2024-12-11 | End: 2024-12-11

## 2024-12-11 RX ORDER — KETOROLAC TROMETHAMINE 30 MG/ML
30 INJECTION, SOLUTION INTRAMUSCULAR; INTRAVENOUS ONCE
Status: COMPLETED | OUTPATIENT
Start: 2024-12-11 | End: 2024-12-11

## 2024-12-11 RX ORDER — LIDOCAINE 50 MG/G
1 PATCH TOPICAL DAILY
Qty: 7 PATCH | Refills: 0 | Status: SHIPPED | OUTPATIENT
Start: 2024-12-11 | End: 2024-12-18

## 2024-12-11 RX ORDER — CYCLOBENZAPRINE HCL 10 MG
10 TABLET ORAL 3 TIMES DAILY PRN
Qty: 15 TABLET | Refills: 0 | Status: SHIPPED | OUTPATIENT
Start: 2024-12-11 | End: 2024-12-11

## 2024-12-11 RX ADMIN — ORPHENADRINE CITRATE 60 MG: 60 INJECTION INTRAMUSCULAR; INTRAVENOUS at 18:09

## 2024-12-11 RX ADMIN — KETOROLAC TROMETHAMINE 30 MG: 30 INJECTION, SOLUTION INTRAMUSCULAR at 18:09

## 2024-12-11 ASSESSMENT — COLUMBIA-SUICIDE SEVERITY RATING SCALE - C-SSRS
6. HAVE YOU EVER DONE ANYTHING, STARTED TO DO ANYTHING, OR PREPARED TO DO ANYTHING TO END YOUR LIFE?: NO
2. HAVE YOU ACTUALLY HAD ANY THOUGHTS OF KILLING YOURSELF?: NO
1. IN THE PAST MONTH, HAVE YOU WISHED YOU WERE DEAD OR WISHED YOU COULD GO TO SLEEP AND NOT WAKE UP?: NO

## 2024-12-11 ASSESSMENT — PAIN DESCRIPTION - LOCATION: LOCATION: RIB CAGE

## 2024-12-11 ASSESSMENT — PAIN SCALES - GENERAL
PAINLEVEL_OUTOF10: 8
PAINLEVEL_OUTOF10: 5 - MODERATE PAIN

## 2024-12-11 ASSESSMENT — PAIN DESCRIPTION - ORIENTATION: ORIENTATION: RIGHT

## 2024-12-11 ASSESSMENT — PAIN DESCRIPTION - PAIN TYPE
TYPE: ACUTE PAIN
TYPE: ACUTE PAIN

## 2024-12-11 ASSESSMENT — PAIN - FUNCTIONAL ASSESSMENT
PAIN_FUNCTIONAL_ASSESSMENT: 0-10
PAIN_FUNCTIONAL_ASSESSMENT: 0-10

## 2024-12-11 NOTE — ED PROVIDER NOTES
"HPI   Chief Complaint   Patient presents with    Back Pain    Rib Pain       54-year-old female with several week history of cough and congestion currently has been on 2 rounds of antibiotic placed on a third round of antibiotic 3 days ago when she went back to the urgent care along with steroids states her symptoms had been improving but she went to turn today she had \"abrupt onset of right lower rib right upper quadrant pain\" worse with movement    Denies any shortness of breath no fevers no nausea vomiting diarrhea    Patient with the upper respiratory/cough symptoms now for approximately 2 weeks                Patient History   Past Medical History:   Diagnosis Date    ADHD     Anxiety      Past Surgical History:   Procedure Laterality Date    TRIGGER FINGER RELEASE Left 02/2024    thumb     Family History   Problem Relation Name Age of Onset    No Known Problems Mother          brain atrophy    Heart disease Father      Diverticulitis Father      Diabetes Father      Diverticulitis Sister      No Known Problems Brother      No Known Problems Son      No Known Problems Son      No Known Problems Son       Social History     Tobacco Use    Smoking status: Every Day     Current packs/day: 0.50     Average packs/day: 0.5 packs/day for 35.0 years (17.5 ttl pk-yrs)     Types: Cigarettes    Smokeless tobacco: Never   Vaping Use    Vaping status: Some Days    Substances: Nicotine    Devices: Disposable   Substance Use Topics    Alcohol use: Yes     Alcohol/week: 1.0 standard drink of alcohol     Types: 1 Glasses of wine per week     Comment: occ    Drug use: Never       Physical Exam   ED Triage Vitals [12/11/24 1710]   Temperature Heart Rate Respirations BP   36.6 °C (97.8 °F) 85 18 (!) 165/104      Pulse Ox Temp Source Heart Rate Source Patient Position   97 % Temporal Monitor --      BP Location FiO2 (%)     -- --       Physical Exam  Vitals and nursing note reviewed.   Constitutional:       Appearance: Normal " appearance.   HENT:      Head: Normocephalic and atraumatic.      Right Ear: External ear normal.      Left Ear: External ear normal.      Nose: Nose normal.      Mouth/Throat:      Mouth: Mucous membranes are moist.      Pharynx: Oropharynx is clear.   Cardiovascular:      Rate and Rhythm: Normal rate and regular rhythm.   Pulmonary:      Effort: Pulmonary effort is normal.      Breath sounds: Normal breath sounds.      Comments: Diminished but no wheezing rales or rhonchi    Positive tenderness with palp to the right lower chest wall right upper quadrant abdomen area    Abdominal:      General: Abdomen is flat. Bowel sounds are normal.      Palpations: Abdomen is soft.   Musculoskeletal:      Comments: No tenderness or pain to the right side back or rib area     Skin:     General: Skin is warm and dry.      Capillary Refill: Capillary refill takes less than 2 seconds.   Neurological:      General: No focal deficit present.      Mental Status: She is alert and oriented to person, place, and time.           ED Course & MDM   Diagnoses as of 12/11/24 1943   Muscle strain of chest wall, initial encounter   Muscle spasm   Yeast infection                 No data recorded     [place] Coma Scale Score: 15 (12/11/24 1716 : Corbin Poe RN)                           Medical Decision Making  Differential:   1) pneumonia   2) chest wall/abdominal wall strain  54-year-old female with cough and congestion that started 2 weeks ago was initially seen at the urgent care with negative COVID and influenza prescribed oral antibiotics (Augmentin) , continued cough and congestion seen as virtual patient prescribed azithromycin told that if it does not improve she needs to return for x-ray, went back to the urgent care 3 days ago had an x-ray showed questionable pneumonia prescribed prednisone oral antibiotics inhaler and cough pills/Tessalon Miguel, patient states cough has been slowly improving but she went to turn today and she  had acute right lower chest wall upper abdominal muscle pain and spasm CT chest abdomen pelvis did show groundglass opacities consistent with pneumonia most pronounced in the left upper lobe but scattered throughout otherwise he is currently on antibiotics, is not hypoxic no respiratory distress currently just complaining of muscle pain will prescribe Flexeril and a Lidoderm patch told patient she can also take hot baths or showers to help with some of the discomfort, that if she develops any chest pain shortness of breath or worsening symptoms including fevers chills she needs to return immediately for further eval       Plan: Discussed differential with the patient and /or parents family   Patient to  follow up with the PCP in the next 2-3 days  Return for any worsening symptoms or go to the ER for further evaluation. Patient/family/caregiver  understands return   precautions and discharge instructions and is agreeable to the current plan   Impression: pneumonia , chest wall muscle strain, abd muscle strain            Orders and Diagnoses for this visit    Labs Reviewed - No data to display  CT chest abdomen pelvis wo IV contrast   Final Result    1. Patchy ground-glass opacities in the bilateral lungs but most    pronounced in the left upper lobe. This is most suggestive of an    infectious or inflammatory process. Recommend follow-up to resolution.    2. Submucosal fat deposition throughout the colon which may be seen    in the setting of chronic inflammation. No evidence of acute    inflammation.    3. Sigmoid colonic diverticulosis without evidence of acute    diverticulitis.          MACRO:    None          Signed by: Noman Malik 12/11/2024 6:43 PM    Dictation workstation:   [id number]             Procedure  Procedures     Yvette Daley PA-C  12/11/24 1848       Yvette Daley PA-C  12/11/24 1908       Yvette Daley PA-C  12/11/24 1909       Yvette Daley PA-C  12/11/24 1943

## 2024-12-11 NOTE — ED TRIAGE NOTES
Pt arrive ambulatory to [facility] presenting with right sided rib/mid back pain. Pt states she was recently diagnosed with pneumonia, on her 3rd antibiotic, and feels the infection is clearing up but she has had this rib pain that she believes is from coughing. Pt denies any CP, SOB, N/V/D, fever and/or chills. Pt A&Ox3, resp easy and unlabored, skin of appropriate color.

## 2025-01-13 DIAGNOSIS — K21.9 GASTROESOPHAGEAL REFLUX DISEASE WITHOUT ESOPHAGITIS: ICD-10-CM

## 2025-01-13 RX ORDER — OMEPRAZOLE 20 MG/1
CAPSULE, DELAYED RELEASE ORAL
Qty: 90 CAPSULE | Refills: 1 | Status: SHIPPED | OUTPATIENT
Start: 2025-01-13

## 2025-02-24 DIAGNOSIS — R41.840 CONCENTRATION DEFICIT: ICD-10-CM

## 2025-02-24 RX ORDER — DEXTROAMPHETAMINE SACCHARATE, AMPHETAMINE ASPARTATE MONOHYDRATE, DEXTROAMPHETAMINE SULFATE AND AMPHETAMINE SULFATE 7.5; 7.5; 7.5; 7.5 MG/1; MG/1; MG/1; MG/1
30 CAPSULE, EXTENDED RELEASE ORAL DAILY
Qty: 30 CAPSULE | Refills: 0 | Status: SHIPPED | OUTPATIENT
Start: 2025-02-24 | End: 2025-03-26

## 2025-04-01 NOTE — PROGRESS NOTES
"Subjective   Patient ID: Angélica Daniel is a 54 y.o. female who presents for Med Refill.    HPI   The pt presents to the clinic for 6-month follow-up (ADD). Past medical hx of anxiety, ADD, and asthma.    - ADD: Stable, on medication. Pt on Adderall for treatment of ADD. Controlled. Doing well on these meds. No side-effects reported. [place]AR[place] report and controlled substance form reviewed.     Patient has been gaining weight.    GERD: Stable- Pt on famotidine  for treatment of GERD. Controlled. Doing well on these meds. No side-effects reported.    Labs ordered    -Abnormal mammogram: ordered US for left breast.     Review of Systems   Constitutional:  Negative for fever.        Also see HPI   Eyes:  Negative for visual disturbance.   Respiratory:  Negative for shortness of breath.    Cardiovascular:  Negative for chest pain.   Gastrointestinal:  Negative for diarrhea and nausea.   Endocrine: Negative.    Genitourinary:  Negative for difficulty urinating.   Skin:  Negative for rash.   Neurological:  Negative for dizziness.        No focal deficits   Psychiatric/Behavioral:  Negative for suicidal ideas.    All other systems reviewed and are negative.      Objective   /80   Pulse 83   Temp 36.7 °C (98.1 °F)   Ht 1.753 m (5' 9\")   Wt 85.9 kg (189 lb 6.4 oz)   SpO2 99%   BMI 27.97 kg/m²     Physical Exam  Vitals and nursing note reviewed.   Constitutional:       Appearance: Normal appearance.   HENT:      Head: Normocephalic and atraumatic.   Eyes:      Conjunctiva/sclera: Conjunctivae normal.   Cardiovascular:      Rate and Rhythm: Normal rate and regular rhythm.      Heart sounds: Normal heart sounds.   Pulmonary:      Effort: Pulmonary effort is normal.      Breath sounds: Normal breath sounds.   Neurological:      Mental Status: She is oriented to person, place, and time.   Psychiatric:         Mood and Affect: Mood normal.         Behavior: Behavior normal.         Assessment/Plan   Diagnoses and all " orders for this visit:  Tired  -     CBC and Auto Differential; Future  -     Comprehensive Metabolic Panel; Future  -     TSH with reflex to Free T4 if abnormal; Future  -     Vitamin B12; Future  Attention and concentration deficit  -     Follow Up In Primary Care - Established  Concentration deficit  -     amphetamine-dextroamphetamine XR (Adderall XR) 30 mg 24 hr capsule; Take 1 capsule (30 mg) by mouth once daily.  -     amphetamine-dextroamphetamine XR (Adderall XR) 30 mg 24 hr capsule; Take 1 capsule (30 mg) by mouth once daily. Do not fill before May 2, 2025.  -     amphetamine-dextroamphetamine XR (Adderall XR) 30 mg 24 hr capsule; Take 1 capsule (30 mg) by mouth once daily. Do not fill before June 1, 2025.  Gastroesophageal reflux disease without esophagitis  -     famotidine (Pepcid) 40 mg tablet; Take 1 tablet (40 mg) by mouth once daily as needed for heartburn or indigestion.  Weight gain  -     CBC and Auto Differential; Future  -     Comprehensive Metabolic Panel; Future  -     TSH with reflex to Free T4 if abnormal; Future  Screening for heart disease  -     Lipid Panel; Future  Hyperglycemia  -     Hemoglobin A1C; Future  Abnormal mammogram  -     BI US breast complete left; Future    Scribe Attestation  By signing my name below, IOlena Scribe attest that this documentation has been prepared under the direction and in the presence of Lopez Segovia DO. All medical record entries made by the Scribe were at my direction or personally dictated by me. I have reviewed the chart and agree that the record accurately reflects my personal performance of the history, physical exam, discussion and plan.

## 2025-04-02 ENCOUNTER — OFFICE VISIT (OUTPATIENT)
Dept: PRIMARY CARE | Facility: CLINIC | Age: 55
End: 2025-04-02
Payer: COMMERCIAL

## 2025-04-02 VITALS
HEIGHT: 69 IN | WEIGHT: 189.4 LBS | SYSTOLIC BLOOD PRESSURE: 130 MMHG | DIASTOLIC BLOOD PRESSURE: 80 MMHG | HEART RATE: 83 BPM | BODY MASS INDEX: 28.05 KG/M2 | OXYGEN SATURATION: 99 % | TEMPERATURE: 98.1 F

## 2025-04-02 DIAGNOSIS — R41.840 CONCENTRATION DEFICIT: ICD-10-CM

## 2025-04-02 DIAGNOSIS — R92.8 ABNORMAL MAMMOGRAM: ICD-10-CM

## 2025-04-02 DIAGNOSIS — R73.9 HYPERGLYCEMIA: ICD-10-CM

## 2025-04-02 DIAGNOSIS — K21.9 GASTROESOPHAGEAL REFLUX DISEASE WITHOUT ESOPHAGITIS: ICD-10-CM

## 2025-04-02 DIAGNOSIS — R53.83 TIRED: Primary | ICD-10-CM

## 2025-04-02 DIAGNOSIS — Z13.6 SCREENING FOR HEART DISEASE: ICD-10-CM

## 2025-04-02 DIAGNOSIS — R41.840 ATTENTION AND CONCENTRATION DEFICIT: ICD-10-CM

## 2025-04-02 DIAGNOSIS — R63.5 WEIGHT GAIN: ICD-10-CM

## 2025-04-02 PROCEDURE — 99214 OFFICE O/P EST MOD 30 MIN: CPT | Performed by: FAMILY MEDICINE

## 2025-04-02 PROCEDURE — 3008F BODY MASS INDEX DOCD: CPT | Performed by: FAMILY MEDICINE

## 2025-04-02 RX ORDER — DEXTROAMPHETAMINE SACCHARATE, AMPHETAMINE ASPARTATE MONOHYDRATE, DEXTROAMPHETAMINE SULFATE AND AMPHETAMINE SULFATE 7.5; 7.5; 7.5; 7.5 MG/1; MG/1; MG/1; MG/1
30 CAPSULE, EXTENDED RELEASE ORAL DAILY
Qty: 30 CAPSULE | Refills: 0 | Status: SHIPPED | OUTPATIENT
Start: 2025-04-02 | End: 2025-05-02

## 2025-04-02 RX ORDER — DEXTROAMPHETAMINE SACCHARATE, AMPHETAMINE ASPARTATE MONOHYDRATE, DEXTROAMPHETAMINE SULFATE AND AMPHETAMINE SULFATE 7.5; 7.5; 7.5; 7.5 MG/1; MG/1; MG/1; MG/1
30 CAPSULE, EXTENDED RELEASE ORAL DAILY
Qty: 30 CAPSULE | Refills: 0 | Status: SHIPPED | OUTPATIENT
Start: 2025-05-02 | End: 2025-06-01

## 2025-04-02 RX ORDER — DEXTROAMPHETAMINE SACCHARATE, AMPHETAMINE ASPARTATE MONOHYDRATE, DEXTROAMPHETAMINE SULFATE AND AMPHETAMINE SULFATE 7.5; 7.5; 7.5; 7.5 MG/1; MG/1; MG/1; MG/1
30 CAPSULE, EXTENDED RELEASE ORAL DAILY
Qty: 30 CAPSULE | Refills: 0 | Status: SHIPPED | OUTPATIENT
Start: 2025-06-01 | End: 2025-07-01

## 2025-04-02 RX ORDER — FAMOTIDINE 40 MG/1
40 TABLET, FILM COATED ORAL DAILY PRN
Qty: 90 TABLET | Refills: 3 | Status: SHIPPED | OUTPATIENT
Start: 2025-04-02

## 2025-04-02 ASSESSMENT — ENCOUNTER SYMPTOMS
ENDOCRINE NEGATIVE: 1
SHORTNESS OF BREATH: 0
LOSS OF SENSATION IN FEET: 0
DIZZINESS: 0
OCCASIONAL FEELINGS OF UNSTEADINESS: 0
NAUSEA: 0
DIARRHEA: 0
DIFFICULTY URINATING: 0
DEPRESSION: 0
FEVER: 0

## 2025-04-02 ASSESSMENT — PATIENT HEALTH QUESTIONNAIRE - PHQ9
SUM OF ALL RESPONSES TO PHQ9 QUESTIONS 1 AND 2: 0
1. LITTLE INTEREST OR PLEASURE IN DOING THINGS: NOT AT ALL
2. FEELING DOWN, DEPRESSED OR HOPELESS: NOT AT ALL

## 2025-04-02 ASSESSMENT — PAIN SCALES - GENERAL: PAINLEVEL_OUTOF10: 0-NO PAIN

## 2025-04-14 ENCOUNTER — APPOINTMENT (OUTPATIENT)
Dept: RADIOLOGY | Facility: HOSPITAL | Age: 55
End: 2025-04-14
Payer: COMMERCIAL

## 2025-04-16 ENCOUNTER — APPOINTMENT (OUTPATIENT)
Dept: RADIOLOGY | Facility: HOSPITAL | Age: 55
End: 2025-04-16
Payer: COMMERCIAL

## 2025-05-08 ENCOUNTER — TELEMEDICINE (OUTPATIENT)
Dept: URGENT CARE | Age: 55
End: 2025-05-08
Payer: COMMERCIAL

## 2025-05-08 DIAGNOSIS — R05.9 COUGH, UNSPECIFIED TYPE: Primary | ICD-10-CM

## 2025-05-08 RX ORDER — BENZONATATE 200 MG/1
200 CAPSULE ORAL 3 TIMES DAILY PRN
Qty: 21 CAPSULE | Refills: 0 | Status: ON HOLD | OUTPATIENT
Start: 2025-05-08 | End: 2025-06-07

## 2025-05-08 RX ORDER — DOXYCYCLINE HYCLATE 100 MG
100 TABLET ORAL 2 TIMES DAILY
Qty: 20 TABLET | Refills: 0 | Status: ON HOLD | OUTPATIENT
Start: 2025-05-08 | End: 2025-05-18

## 2025-05-08 NOTE — PROGRESS NOTES
Urgent Care Virtual Video Visit    Patient Location: Home  Provider Location: [facility]    Video visit completed with realtime synchronous video/audio connection. Informed consent was obtained from the patient. Patient was made aware that my evaluation and diagnosis are limited due to the fact that we are not in the same room during the interview and that this is a virtual encounter that took place via videoconferencing. Patient verbalized understanding.     S:  Notes 3 weeks of initially some URI sxs and cough.  Cough has persisted.  More stuffy than runny.  No ST or earache.  No N/V/D.  Uses  PRN MDI minimal relief.      No F/C, some fatigue.  No sick contacts.  + Asthma, + tobacco    Tried Allegra and Robitussin.  Occ Mucinex.  Not much relief    O:  Exam limited due to nature of visit    A/P    Persistent cough and congestion-history and sound of cough seem consistent with a COPD exacerbation.  She denies any diagnosis but no PFTs on file.  Struggled with a long cough at the end of last year that took several visits, including an ER visit multiple rounds of antibiotics to resolve.      Would prefer her to be seen in person but given her history we will start on doxycycline with the caveat that A) she see her primary care doctor next week for follow-up to make sure this is improving.  and B) if she gets worse at all this weekend needs to go inperson facility.  Patient aware, will start doxycycline, will continue home albuterol, ER precautions given  Patient instructed to seek inperson care if symptoms evolve or not improving    Patient disposition: Home

## 2025-05-11 ENCOUNTER — APPOINTMENT (OUTPATIENT)
Dept: RADIOLOGY | Facility: HOSPITAL | Age: 55
End: 2025-05-11
Payer: COMMERCIAL

## 2025-05-11 ENCOUNTER — HOSPITAL ENCOUNTER (OUTPATIENT)
Facility: HOSPITAL | Age: 55
Setting detail: OBSERVATION
Discharge: HOME | End: 2025-05-14
Attending: EMERGENCY MEDICINE | Admitting: HOSPITALIST
Payer: COMMERCIAL

## 2025-05-11 ENCOUNTER — APPOINTMENT (OUTPATIENT)
Dept: CARDIOLOGY | Facility: HOSPITAL | Age: 55
End: 2025-05-11
Payer: COMMERCIAL

## 2025-05-11 DIAGNOSIS — J40 BRONCHITIS: Primary | ICD-10-CM

## 2025-05-11 DIAGNOSIS — J15.9 COMMUNITY ACQUIRED BACTERIAL PNEUMONIA: ICD-10-CM

## 2025-05-11 DIAGNOSIS — Z78.9 FAILURE OF OUTPATIENT TREATMENT: ICD-10-CM

## 2025-05-11 PROBLEM — J44.1 COPD EXACERBATION (MULTI): Status: ACTIVE | Noted: 2025-05-11

## 2025-05-11 LAB
ALBUMIN SERPL BCP-MCNC: 4.1 G/DL (ref 3.4–5)
ALP SERPL-CCNC: 96 U/L (ref 33–110)
ALT SERPL W P-5'-P-CCNC: 19 U/L (ref 7–45)
ANION GAP SERPL CALC-SCNC: 13 MMOL/L (ref 10–20)
AST SERPL W P-5'-P-CCNC: 17 U/L (ref 9–39)
BASOPHILS # BLD AUTO: 0.05 X10*3/UL (ref 0–0.1)
BASOPHILS NFR BLD AUTO: 0.5 %
BILIRUB SERPL-MCNC: 0.2 MG/DL (ref 0–1.2)
BNP SERPL-MCNC: 11 PG/ML (ref 0–99)
BUN SERPL-MCNC: 8 MG/DL (ref 6–23)
CALCIUM SERPL-MCNC: 9.5 MG/DL (ref 8.6–10.3)
CARDIAC TROPONIN I PNL SERPL HS: <3 NG/L (ref 0–13)
CHLORIDE SERPL-SCNC: 104 MMOL/L (ref 98–107)
CO2 SERPL-SCNC: 25 MMOL/L (ref 21–32)
CREAT SERPL-MCNC: 0.69 MG/DL (ref 0.5–1.05)
EGFRCR SERPLBLD CKD-EPI 2021: >90 ML/MIN/1.73M*2
EOSINOPHIL # BLD AUTO: 0.09 X10*3/UL (ref 0–0.7)
EOSINOPHIL NFR BLD AUTO: 0.9 %
ERYTHROCYTE [DISTWIDTH] IN BLOOD BY AUTOMATED COUNT: 12.2 % (ref 11.5–14.5)
FLUAV RNA RESP QL NAA+PROBE: NOT DETECTED
FLUBV RNA RESP QL NAA+PROBE: NOT DETECTED
GLUCOSE SERPL-MCNC: 86 MG/DL (ref 74–99)
HCT VFR BLD AUTO: 39.1 % (ref 36–46)
HGB BLD-MCNC: 13.3 G/DL (ref 12–16)
IMM GRANULOCYTES # BLD AUTO: 0.05 X10*3/UL (ref 0–0.7)
IMM GRANULOCYTES NFR BLD AUTO: 0.5 % (ref 0–0.9)
INR PPP: 1 (ref 0.9–1.1)
LACTATE SERPL-SCNC: 1.1 MMOL/L (ref 0.4–2)
LYMPHOCYTES # BLD AUTO: 4.15 X10*3/UL (ref 1.2–4.8)
LYMPHOCYTES NFR BLD AUTO: 42.4 %
MAGNESIUM SERPL-MCNC: 2.01 MG/DL (ref 1.6–2.4)
MCH RBC QN AUTO: 31.6 PG (ref 26–34)
MCHC RBC AUTO-ENTMCNC: 34 G/DL (ref 32–36)
MCV RBC AUTO: 93 FL (ref 80–100)
MONOCYTES # BLD AUTO: 0.84 X10*3/UL (ref 0.1–1)
MONOCYTES NFR BLD AUTO: 8.6 %
NEUTROPHILS # BLD AUTO: 4.6 X10*3/UL (ref 1.2–7.7)
NEUTROPHILS NFR BLD AUTO: 47.1 %
NRBC BLD-RTO: 0 /100 WBCS (ref 0–0)
PLATELET # BLD AUTO: 347 X10*3/UL (ref 150–450)
POTASSIUM SERPL-SCNC: 3.9 MMOL/L (ref 3.5–5.3)
PROT SERPL-MCNC: 7.1 G/DL (ref 6.4–8.2)
PROTHROMBIN TIME: 11.5 SECONDS (ref 9.8–12.4)
RBC # BLD AUTO: 4.21 X10*6/UL (ref 4–5.2)
SARS-COV-2 RNA RESP QL NAA+PROBE: NOT DETECTED
SODIUM SERPL-SCNC: 138 MMOL/L (ref 136–145)
WBC # BLD AUTO: 9.8 X10*3/UL (ref 4.4–11.3)

## 2025-05-11 PROCEDURE — 85610 PROTHROMBIN TIME: CPT | Performed by: HEALTH CARE PROVIDER

## 2025-05-11 PROCEDURE — 94760 N-INVAS EAR/PLS OXIMETRY 1: CPT

## 2025-05-11 PROCEDURE — 94664 DEMO&/EVAL PT USE INHALER: CPT | Mod: 59

## 2025-05-11 PROCEDURE — 87636 SARSCOV2 & INF A&B AMP PRB: CPT | Performed by: EMERGENCY MEDICINE

## 2025-05-11 PROCEDURE — 83605 ASSAY OF LACTIC ACID: CPT | Performed by: HEALTH CARE PROVIDER

## 2025-05-11 PROCEDURE — 94640 AIRWAY INHALATION TREATMENT: CPT

## 2025-05-11 PROCEDURE — 2500000002 HC RX 250 W HCPCS SELF ADMINISTERED DRUGS (ALT 637 FOR MEDICARE OP, ALT 636 FOR OP/ED): Performed by: HEALTH CARE PROVIDER

## 2025-05-11 PROCEDURE — 83735 ASSAY OF MAGNESIUM: CPT | Performed by: HEALTH CARE PROVIDER

## 2025-05-11 PROCEDURE — 2500000002 HC RX 250 W HCPCS SELF ADMINISTERED DRUGS (ALT 637 FOR MEDICARE OP, ALT 636 FOR OP/ED): Mod: JZ | Performed by: EMERGENCY MEDICINE

## 2025-05-11 PROCEDURE — 93005 ELECTROCARDIOGRAM TRACING: CPT

## 2025-05-11 PROCEDURE — 96375 TX/PRO/DX INJ NEW DRUG ADDON: CPT

## 2025-05-11 PROCEDURE — 36415 COLL VENOUS BLD VENIPUNCTURE: CPT | Performed by: HEALTH CARE PROVIDER

## 2025-05-11 PROCEDURE — 2500000004 HC RX 250 GENERAL PHARMACY W/ HCPCS (ALT 636 FOR OP/ED): Mod: JZ | Performed by: HEALTH CARE PROVIDER

## 2025-05-11 PROCEDURE — 80053 COMPREHEN METABOLIC PANEL: CPT | Performed by: HEALTH CARE PROVIDER

## 2025-05-11 PROCEDURE — 71045 X-RAY EXAM CHEST 1 VIEW: CPT | Performed by: STUDENT IN AN ORGANIZED HEALTH CARE EDUCATION/TRAINING PROGRAM

## 2025-05-11 PROCEDURE — 85025 COMPLETE CBC W/AUTO DIFF WBC: CPT | Performed by: HEALTH CARE PROVIDER

## 2025-05-11 PROCEDURE — 99285 EMERGENCY DEPT VISIT HI MDM: CPT | Mod: 25 | Performed by: EMERGENCY MEDICINE

## 2025-05-11 PROCEDURE — 96361 HYDRATE IV INFUSION ADD-ON: CPT

## 2025-05-11 PROCEDURE — 83880 ASSAY OF NATRIURETIC PEPTIDE: CPT | Performed by: HEALTH CARE PROVIDER

## 2025-05-11 PROCEDURE — 71045 X-RAY EXAM CHEST 1 VIEW: CPT

## 2025-05-11 PROCEDURE — 84484 ASSAY OF TROPONIN QUANT: CPT | Performed by: HEALTH CARE PROVIDER

## 2025-05-11 RX ORDER — GUAIFENESIN 100 MG/5ML
200 LIQUID ORAL EVERY 4 HOURS PRN
Status: DISCONTINUED | OUTPATIENT
Start: 2025-05-11 | End: 2025-05-14 | Stop reason: HOSPADM

## 2025-05-11 RX ORDER — ALBUTEROL SULFATE 0.83 MG/ML
2.5 SOLUTION RESPIRATORY (INHALATION) ONCE
Status: COMPLETED | OUTPATIENT
Start: 2025-05-11 | End: 2025-05-11

## 2025-05-11 RX ORDER — ACETAMINOPHEN 500 MG
10 TABLET ORAL DAILY PRN
Status: DISCONTINUED | OUTPATIENT
Start: 2025-05-11 | End: 2025-05-14 | Stop reason: HOSPADM

## 2025-05-11 RX ORDER — ACETAMINOPHEN 325 MG/1
650 TABLET ORAL EVERY 6 HOURS PRN
Status: DISCONTINUED | OUTPATIENT
Start: 2025-05-11 | End: 2025-05-14 | Stop reason: HOSPADM

## 2025-05-11 RX ORDER — ALBUTEROL SULFATE 0.83 MG/ML
2.5 SOLUTION RESPIRATORY (INHALATION) EVERY 4 HOURS PRN
Status: DISCONTINUED | OUTPATIENT
Start: 2025-05-11 | End: 2025-05-12

## 2025-05-11 RX ORDER — IPRATROPIUM BROMIDE AND ALBUTEROL SULFATE 2.5; .5 MG/3ML; MG/3ML
3 SOLUTION RESPIRATORY (INHALATION) ONCE
Status: COMPLETED | OUTPATIENT
Start: 2025-05-11 | End: 2025-05-11

## 2025-05-11 RX ORDER — ENOXAPARIN SODIUM 100 MG/ML
40 INJECTION SUBCUTANEOUS DAILY
Status: DISCONTINUED | OUTPATIENT
Start: 2025-05-12 | End: 2025-05-14 | Stop reason: HOSPADM

## 2025-05-11 RX ORDER — ONDANSETRON HYDROCHLORIDE 2 MG/ML
4 INJECTION, SOLUTION INTRAVENOUS EVERY 4 HOURS PRN
Status: DISCONTINUED | OUTPATIENT
Start: 2025-05-11 | End: 2025-05-14 | Stop reason: HOSPADM

## 2025-05-11 RX ORDER — POLYETHYLENE GLYCOL 3350 17 G/17G
17 POWDER, FOR SOLUTION ORAL 2 TIMES DAILY PRN
Status: DISCONTINUED | OUTPATIENT
Start: 2025-05-11 | End: 2025-05-14 | Stop reason: HOSPADM

## 2025-05-11 RX ORDER — ALUMINUM HYDROXIDE, MAGNESIUM HYDROXIDE, AND SIMETHICONE 1200; 120; 1200 MG/30ML; MG/30ML; MG/30ML
20 SUSPENSION ORAL EVERY 4 HOURS PRN
Status: DISCONTINUED | OUTPATIENT
Start: 2025-05-11 | End: 2025-05-14 | Stop reason: HOSPADM

## 2025-05-11 RX ORDER — IPRATROPIUM BROMIDE AND ALBUTEROL SULFATE 2.5; .5 MG/3ML; MG/3ML
3 SOLUTION RESPIRATORY (INHALATION)
Status: DISCONTINUED | OUTPATIENT
Start: 2025-05-12 | End: 2025-05-12

## 2025-05-11 RX ADMIN — SODIUM CHLORIDE, SODIUM LACTATE, POTASSIUM CHLORIDE, AND CALCIUM CHLORIDE 1000 ML: .6; .31; .03; .02 INJECTION, SOLUTION INTRAVENOUS at 21:51

## 2025-05-11 RX ADMIN — METHYLPREDNISOLONE SODIUM SUCCINATE 125 MG: 125 INJECTION, POWDER, FOR SOLUTION INTRAMUSCULAR; INTRAVENOUS at 21:50

## 2025-05-11 RX ADMIN — IPRATROPIUM BROMIDE AND ALBUTEROL SULFATE 3 ML: .5; 3 SOLUTION RESPIRATORY (INHALATION) at 22:32

## 2025-05-11 RX ADMIN — ALBUTEROL SULFATE 2.5 MG: 2.5 SOLUTION RESPIRATORY (INHALATION) at 20:56

## 2025-05-11 RX ADMIN — IPRATROPIUM BROMIDE AND ALBUTEROL SULFATE 3 ML: .5; 3 SOLUTION RESPIRATORY (INHALATION) at 20:56

## 2025-05-11 SDOH — SOCIAL STABILITY: SOCIAL INSECURITY: WERE YOU ABLE TO COMPLETE ALL THE BEHAVIORAL HEALTH SCREENINGS?: YES

## 2025-05-11 SDOH — SOCIAL STABILITY: SOCIAL INSECURITY: HAVE YOU HAD THOUGHTS OF HARMING ANYONE ELSE?: NO

## 2025-05-11 SDOH — SOCIAL STABILITY: SOCIAL INSECURITY: HAVE YOU HAD ANY THOUGHTS OF HARMING ANYONE ELSE?: NO

## 2025-05-11 SDOH — SOCIAL STABILITY: SOCIAL INSECURITY: ARE THERE ANY APPARENT SIGNS OF INJURIES/BEHAVIORS THAT COULD BE RELATED TO ABUSE/NEGLECT?: NO

## 2025-05-11 SDOH — SOCIAL STABILITY: SOCIAL INSECURITY: ABUSE: ADULT

## 2025-05-11 SDOH — SOCIAL STABILITY: SOCIAL INSECURITY: ARE YOU OR HAVE YOU BEEN THREATENED OR ABUSED PHYSICALLY, EMOTIONALLY, OR SEXUALLY BY ANYONE?: NO

## 2025-05-11 SDOH — SOCIAL STABILITY: SOCIAL INSECURITY: HAS ANYONE EVER THREATENED TO HURT YOUR FAMILY OR YOUR PETS?: NO

## 2025-05-11 SDOH — SOCIAL STABILITY: SOCIAL INSECURITY: DOES ANYONE TRY TO KEEP YOU FROM HAVING/CONTACTING OTHER FRIENDS OR DOING THINGS OUTSIDE YOUR HOME?: NO

## 2025-05-11 SDOH — SOCIAL STABILITY: SOCIAL INSECURITY: DO YOU FEEL UNSAFE GOING BACK TO THE PLACE WHERE YOU ARE LIVING?: NO

## 2025-05-11 SDOH — SOCIAL STABILITY: SOCIAL INSECURITY: DO YOU FEEL ANYONE HAS EXPLOITED OR TAKEN ADVANTAGE OF YOU FINANCIALLY OR OF YOUR PERSONAL PROPERTY?: NO

## 2025-05-11 ASSESSMENT — ACTIVITIES OF DAILY LIVING (ADL)
DRESSING YOURSELF: INDEPENDENT
GROOMING: INDEPENDENT
TOILETING: INDEPENDENT
WALKS IN HOME: INDEPENDENT
PATIENT'S MEMORY ADEQUATE TO SAFELY COMPLETE DAILY ACTIVITIES?: YES
ADEQUATE_TO_COMPLETE_ADL: YES
FEEDING YOURSELF: INDEPENDENT
JUDGMENT_ADEQUATE_SAFELY_COMPLETE_DAILY_ACTIVITIES: YES
HEARING - LEFT EAR: FUNCTIONAL
BATHING: INDEPENDENT
ASSISTIVE_DEVICE: DENTURES LOWER;DENTURES UPPER;EYEGLASSES
HEARING - RIGHT EAR: FUNCTIONAL

## 2025-05-11 ASSESSMENT — LIFESTYLE VARIABLES
SUBSTANCE_ABUSE_PAST_12_MONTHS: NO
HOW MANY STANDARD DRINKS CONTAINING ALCOHOL DO YOU HAVE ON A TYPICAL DAY: 1 OR 2
HOW OFTEN DO YOU HAVE 6 OR MORE DRINKS ON ONE OCCASION: NEVER
PRESCIPTION_ABUSE_PAST_12_MONTHS: NO
HOW OFTEN DO YOU HAVE A DRINK CONTAINING ALCOHOL: MONTHLY OR LESS
SKIP TO QUESTIONS 9-10: 1
AUDIT-C TOTAL SCORE: 1
AUDIT-C TOTAL SCORE: 1

## 2025-05-11 ASSESSMENT — COGNITIVE AND FUNCTIONAL STATUS - GENERAL
PATIENT BASELINE BEDBOUND: NO
MOBILITY SCORE: 24
DAILY ACTIVITIY SCORE: 24

## 2025-05-11 ASSESSMENT — COLUMBIA-SUICIDE SEVERITY RATING SCALE - C-SSRS
6. HAVE YOU EVER DONE ANYTHING, STARTED TO DO ANYTHING, OR PREPARED TO DO ANYTHING TO END YOUR LIFE?: NO
6. HAVE YOU EVER DONE ANYTHING, STARTED TO DO ANYTHING, OR PREPARED TO DO ANYTHING TO END YOUR LIFE?: NO
2. HAVE YOU ACTUALLY HAD ANY THOUGHTS OF KILLING YOURSELF?: NO
1. IN THE PAST MONTH, HAVE YOU WISHED YOU WERE DEAD OR WISHED YOU COULD GO TO SLEEP AND NOT WAKE UP?: NO
2. HAVE YOU ACTUALLY HAD ANY THOUGHTS OF KILLING YOURSELF?: NO
1. IN THE PAST MONTH, HAVE YOU WISHED YOU WERE DEAD OR WISHED YOU COULD GO TO SLEEP AND NOT WAKE UP?: NO

## 2025-05-11 ASSESSMENT — PATIENT HEALTH QUESTIONNAIRE - PHQ9
1. LITTLE INTEREST OR PLEASURE IN DOING THINGS: NOT AT ALL
2. FEELING DOWN, DEPRESSED OR HOPELESS: NOT AT ALL
SUM OF ALL RESPONSES TO PHQ9 QUESTIONS 1 & 2: 0

## 2025-05-11 ASSESSMENT — PAIN SCALES - GENERAL
PAINLEVEL_OUTOF10: 0 - NO PAIN
PAINLEVEL_OUTOF10: 0 - NO PAIN

## 2025-05-11 ASSESSMENT — PAIN - FUNCTIONAL ASSESSMENT: PAIN_FUNCTIONAL_ASSESSMENT: 0-10

## 2025-05-11 NOTE — LETTER
May 14, 2025     Patient: Angélica Daniel   YOB: 1970   Date of Visit: 5/11/2025       To Whom It May Concern:    Angélica Daniel was admitted on 5/11/2025 and discharged on 5/14/25 . Please excuse Angélica for her absence from work. She may return to work on 5/19/25 with no restrictions.    If you have any questions or concerns, please don't hesitate to call.         Sincerely,         ESTEPHANIA Olivas-CNP        CC: No Recipients

## 2025-05-11 NOTE — ED TRIAGE NOTES
Pt states that she has not been feeling good for a few weeks, cough, feels like a chest cold. Online doctor gave doxycyline and tessalon pearles, believed that it was a COPD exacerbation. Pt reports a low grade fever-100.1F.

## 2025-05-12 ENCOUNTER — APPOINTMENT (OUTPATIENT)
Dept: RADIOLOGY | Facility: HOSPITAL | Age: 55
End: 2025-05-12
Payer: COMMERCIAL

## 2025-05-12 PROBLEM — Z72.0 TOBACCO USE: Status: ACTIVE | Noted: 2025-05-12

## 2025-05-12 PROBLEM — J15.9 COMMUNITY ACQUIRED BACTERIAL PNEUMONIA: Status: ACTIVE | Noted: 2025-05-12

## 2025-05-12 PROBLEM — K21.9 CHRONIC GERD: Status: ACTIVE | Noted: 2025-05-12

## 2025-05-12 LAB
APPEARANCE UR: CLEAR
BILIRUB UR STRIP.AUTO-MCNC: NEGATIVE MG/DL
COLOR UR: ABNORMAL
GLUCOSE UR STRIP.AUTO-MCNC: ABNORMAL MG/DL
HOLD SPECIMEN: 293
KETONES UR STRIP.AUTO-MCNC: NEGATIVE MG/DL
LEUKOCYTE ESTERASE UR QL STRIP.AUTO: NEGATIVE
NITRITE UR QL STRIP.AUTO: NEGATIVE
PH UR STRIP.AUTO: 5.5 [PH]
PROT UR STRIP.AUTO-MCNC: NEGATIVE MG/DL
RBC # UR STRIP.AUTO: NEGATIVE MG/DL
SP GR UR STRIP.AUTO: 1.01
UROBILINOGEN UR STRIP.AUTO-MCNC: NORMAL MG/DL

## 2025-05-12 PROCEDURE — 2500000001 HC RX 250 WO HCPCS SELF ADMINISTERED DRUGS (ALT 637 FOR MEDICARE OP): Performed by: STUDENT IN AN ORGANIZED HEALTH CARE EDUCATION/TRAINING PROGRAM

## 2025-05-12 PROCEDURE — 2500000002 HC RX 250 W HCPCS SELF ADMINISTERED DRUGS (ALT 637 FOR MEDICARE OP, ALT 636 FOR OP/ED): Mod: JZ | Performed by: HOSPITALIST

## 2025-05-12 PROCEDURE — 71260 CT THORAX DX C+: CPT

## 2025-05-12 PROCEDURE — 96372 THER/PROPH/DIAG INJ SC/IM: CPT | Performed by: HOSPITALIST

## 2025-05-12 PROCEDURE — 2500000002 HC RX 250 W HCPCS SELF ADMINISTERED DRUGS (ALT 637 FOR MEDICARE OP, ALT 636 FOR OP/ED): Performed by: HOSPITALIST

## 2025-05-12 PROCEDURE — 94760 N-INVAS EAR/PLS OXIMETRY 1: CPT

## 2025-05-12 PROCEDURE — 2500000001 HC RX 250 WO HCPCS SELF ADMINISTERED DRUGS (ALT 637 FOR MEDICARE OP): Performed by: NURSE PRACTITIONER

## 2025-05-12 PROCEDURE — 2500000004 HC RX 250 GENERAL PHARMACY W/ HCPCS (ALT 636 FOR OP/ED): Mod: JZ | Performed by: HOSPITALIST

## 2025-05-12 PROCEDURE — 96365 THER/PROPH/DIAG IV INF INIT: CPT | Mod: 59

## 2025-05-12 PROCEDURE — 71260 CT THORAX DX C+: CPT | Performed by: STUDENT IN AN ORGANIZED HEALTH CARE EDUCATION/TRAINING PROGRAM

## 2025-05-12 PROCEDURE — 81003 URINALYSIS AUTO W/O SCOPE: CPT | Performed by: HEALTH CARE PROVIDER

## 2025-05-12 PROCEDURE — 2500000004 HC RX 250 GENERAL PHARMACY W/ HCPCS (ALT 636 FOR OP/ED): Performed by: NURSE PRACTITIONER

## 2025-05-12 PROCEDURE — G0378 HOSPITAL OBSERVATION PER HR: HCPCS

## 2025-05-12 PROCEDURE — 9420000001 HC RT PATIENT EDUCATION 5 MIN

## 2025-05-12 PROCEDURE — 94640 AIRWAY INHALATION TREATMENT: CPT

## 2025-05-12 PROCEDURE — 2500000001 HC RX 250 WO HCPCS SELF ADMINISTERED DRUGS (ALT 637 FOR MEDICARE OP): Performed by: HOSPITALIST

## 2025-05-12 PROCEDURE — 2550000001 HC RX 255 CONTRASTS: Mod: JZ | Performed by: INTERNAL MEDICINE

## 2025-05-12 PROCEDURE — 96376 TX/PRO/DX INJ SAME DRUG ADON: CPT | Mod: 59

## 2025-05-12 RX ORDER — MICONAZOLE NITRATE 2 %
4 CREAM (GRAM) TOPICAL EVERY 2 HOUR PRN
Status: DISCONTINUED | OUTPATIENT
Start: 2025-05-12 | End: 2025-05-14 | Stop reason: HOSPADM

## 2025-05-12 RX ORDER — ALBUTEROL SULFATE 0.83 MG/ML
2.5 SOLUTION RESPIRATORY (INHALATION) EVERY 2 HOUR PRN
Status: DISCONTINUED | OUTPATIENT
Start: 2025-05-11 | End: 2025-05-14 | Stop reason: HOSPADM

## 2025-05-12 RX ORDER — CEFTRIAXONE 2 G/50ML
2 INJECTION, SOLUTION INTRAVENOUS EVERY 24 HOURS
Status: DISCONTINUED | OUTPATIENT
Start: 2025-05-12 | End: 2025-05-13

## 2025-05-12 RX ORDER — GUAIFENESIN 600 MG/1
600 TABLET, EXTENDED RELEASE ORAL 2 TIMES DAILY
Status: DISCONTINUED | OUTPATIENT
Start: 2025-05-12 | End: 2025-05-14 | Stop reason: HOSPADM

## 2025-05-12 RX ORDER — FAMOTIDINE 20 MG/1
40 TABLET, FILM COATED ORAL DAILY PRN
Status: DISCONTINUED | OUTPATIENT
Start: 2025-05-12 | End: 2025-05-12

## 2025-05-12 RX ORDER — OXYCODONE HYDROCHLORIDE 5 MG/1
5 TABLET ORAL EVERY 4 HOURS PRN
Status: DISCONTINUED | OUTPATIENT
Start: 2025-05-12 | End: 2025-05-14 | Stop reason: HOSPADM

## 2025-05-12 RX ORDER — AZITHROMYCIN 250 MG/1
500 TABLET, FILM COATED ORAL
Status: COMPLETED | OUTPATIENT
Start: 2025-05-12 | End: 2025-05-14

## 2025-05-12 RX ORDER — FAMOTIDINE 20 MG/1
40 TABLET, FILM COATED ORAL DAILY
Status: DISCONTINUED | OUTPATIENT
Start: 2025-05-12 | End: 2025-05-14 | Stop reason: HOSPADM

## 2025-05-12 RX ORDER — LORAZEPAM 0.5 MG/1
0.5 TABLET ORAL EVERY 8 HOURS PRN
Status: DISCONTINUED | OUTPATIENT
Start: 2025-05-12 | End: 2025-05-14 | Stop reason: HOSPADM

## 2025-05-12 RX ORDER — DEXTROAMPHETAMINE SACCHARATE, AMPHETAMINE ASPARTATE MONOHYDRATE, DEXTROAMPHETAMINE SULFATE AND AMPHETAMINE SULFATE 2.5; 2.5; 2.5; 2.5 MG/1; MG/1; MG/1; MG/1
30 CAPSULE, EXTENDED RELEASE ORAL DAILY
Refills: 0 | Status: DISCONTINUED | OUTPATIENT
Start: 2025-05-12 | End: 2025-05-14 | Stop reason: HOSPADM

## 2025-05-12 RX ORDER — IPRATROPIUM BROMIDE AND ALBUTEROL SULFATE 2.5; .5 MG/3ML; MG/3ML
3 SOLUTION RESPIRATORY (INHALATION)
Status: DISCONTINUED | OUTPATIENT
Start: 2025-05-12 | End: 2025-05-13

## 2025-05-12 RX ORDER — IBUPROFEN 600 MG/1
600 TABLET ORAL EVERY 6 HOURS PRN
Status: DISCONTINUED | OUTPATIENT
Start: 2025-05-12 | End: 2025-05-14 | Stop reason: HOSPADM

## 2025-05-12 RX ORDER — BENZONATATE 100 MG/1
200 CAPSULE ORAL 3 TIMES DAILY
Status: DISCONTINUED | OUTPATIENT
Start: 2025-05-12 | End: 2025-05-14 | Stop reason: HOSPADM

## 2025-05-12 RX ADMIN — METHYLPREDNISOLONE SODIUM SUCCINATE 40 MG: 40 INJECTION, POWDER, FOR SOLUTION INTRAMUSCULAR; INTRAVENOUS at 23:03

## 2025-05-12 RX ADMIN — GUAIFENESIN 200 MG: 200 SOLUTION ORAL at 10:08

## 2025-05-12 RX ADMIN — NICOTINE POLACRILEX 4 MG: 2 GUM, CHEWING BUCCAL at 15:48

## 2025-05-12 RX ADMIN — IPRATROPIUM BROMIDE AND ALBUTEROL SULFATE 3 ML: .5; 3 SOLUTION RESPIRATORY (INHALATION) at 09:13

## 2025-05-12 RX ADMIN — ACETAMINOPHEN 650 MG: 325 TABLET, FILM COATED ORAL at 19:02

## 2025-05-12 RX ADMIN — BENZONATATE 200 MG: 100 CAPSULE ORAL at 15:52

## 2025-05-12 RX ADMIN — IPRATROPIUM BROMIDE AND ALBUTEROL SULFATE 3 ML: .5; 3 SOLUTION RESPIRATORY (INHALATION) at 15:22

## 2025-05-12 RX ADMIN — NICOTINE POLACRILEX 4 MG: 2 GUM, CHEWING BUCCAL at 19:05

## 2025-05-12 RX ADMIN — OXYCODONE 5 MG: 5 TABLET ORAL at 21:38

## 2025-05-12 RX ADMIN — CEFTRIAXONE 2 G: 2 INJECTION, SOLUTION INTRAVENOUS at 16:00

## 2025-05-12 RX ADMIN — IOHEXOL 50 ML: 350 INJECTION, SOLUTION INTRAVENOUS at 07:43

## 2025-05-12 RX ADMIN — AZITHROMYCIN DIHYDRATE 500 MG: 250 TABLET ORAL at 10:03

## 2025-05-12 RX ADMIN — BENZOCAINE AND MENTHOL 1 LOZENGE: 15; 3.6 LOZENGE ORAL at 01:28

## 2025-05-12 RX ADMIN — BENZONATATE 200 MG: 100 CAPSULE ORAL at 20:56

## 2025-05-12 RX ADMIN — BENZOCAINE AND MENTHOL 1 LOZENGE: 15; 3.6 LOZENGE ORAL at 10:05

## 2025-05-12 RX ADMIN — GUAIFENESIN 200 MG: 200 SOLUTION ORAL at 01:28

## 2025-05-12 RX ADMIN — METHYLPREDNISOLONE SODIUM SUCCINATE 40 MG: 40 INJECTION, POWDER, FOR SOLUTION INTRAMUSCULAR; INTRAVENOUS at 15:51

## 2025-05-12 RX ADMIN — IPRATROPIUM BROMIDE AND ALBUTEROL SULFATE 3 ML: .5; 3 SOLUTION RESPIRATORY (INHALATION) at 21:42

## 2025-05-12 RX ADMIN — ENOXAPARIN SODIUM 40 MG: 40 INJECTION SUBCUTANEOUS at 10:03

## 2025-05-12 RX ADMIN — FAMOTIDINE 40 MG: 20 TABLET, FILM COATED ORAL at 10:03

## 2025-05-12 RX ADMIN — GUAIFENESIN 200 MG: 200 SOLUTION ORAL at 18:46

## 2025-05-12 RX ADMIN — GUAIFENESIN 600 MG: 600 TABLET, EXTENDED RELEASE ORAL at 20:56

## 2025-05-12 RX ADMIN — METHYLPREDNISOLONE SODIUM SUCCINATE 40 MG: 40 INJECTION, POWDER, FOR SOLUTION INTRAMUSCULAR; INTRAVENOUS at 06:02

## 2025-05-12 SDOH — SOCIAL STABILITY: SOCIAL NETWORK: HOW OFTEN DO YOU GET TOGETHER WITH FRIENDS OR RELATIVES?: MORE THAN THREE TIMES A WEEK

## 2025-05-12 SDOH — SOCIAL STABILITY: SOCIAL INSECURITY
WITHIN THE LAST YEAR, HAVE YOU BEEN RAPED OR FORCED TO HAVE ANY KIND OF SEXUAL ACTIVITY BY YOUR PARTNER OR EX-PARTNER?: NO

## 2025-05-12 SDOH — SOCIAL STABILITY: SOCIAL NETWORK: HOW OFTEN DO YOU ATTEND CHURCH OR RELIGIOUS SERVICES?: NEVER

## 2025-05-12 SDOH — SOCIAL STABILITY: SOCIAL NETWORK: HOW OFTEN DO YOU ATTEND MEETINGS OF THE CLUBS OR ORGANIZATIONS YOU BELONG TO?: NEVER

## 2025-05-12 SDOH — HEALTH STABILITY: PHYSICAL HEALTH: ON AVERAGE, HOW MANY DAYS PER WEEK DO YOU ENGAGE IN MODERATE TO STRENUOUS EXERCISE (LIKE A BRISK WALK)?: 0 DAYS

## 2025-05-12 SDOH — ECONOMIC STABILITY: FOOD INSECURITY: WITHIN THE PAST 12 MONTHS, THE FOOD YOU BOUGHT JUST DIDN'T LAST AND YOU DIDN'T HAVE MONEY TO GET MORE.: NEVER TRUE

## 2025-05-12 SDOH — HEALTH STABILITY: PHYSICAL HEALTH
HOW OFTEN DO YOU NEED TO HAVE SOMEONE HELP YOU WHEN YOU READ INSTRUCTIONS, PAMPHLETS, OR OTHER WRITTEN MATERIAL FROM YOUR DOCTOR OR PHARMACY?: NEVER

## 2025-05-12 SDOH — HEALTH STABILITY: MENTAL HEALTH
DO YOU FEEL STRESS - TENSE, RESTLESS, NERVOUS, OR ANXIOUS, OR UNABLE TO SLEEP AT NIGHT BECAUSE YOUR MIND IS TROUBLED ALL THE TIME - THESE DAYS?: NOT AT ALL

## 2025-05-12 SDOH — SOCIAL STABILITY: SOCIAL INSECURITY: ARE YOU MARRIED, WIDOWED, DIVORCED, SEPARATED, NEVER MARRIED, OR LIVING WITH A PARTNER?: MARRIED

## 2025-05-12 SDOH — SOCIAL STABILITY: SOCIAL NETWORK
IN A TYPICAL WEEK, HOW MANY TIMES DO YOU TALK ON THE PHONE WITH FAMILY, FRIENDS, OR NEIGHBORS?: MORE THAN THREE TIMES A WEEK

## 2025-05-12 SDOH — ECONOMIC STABILITY: FOOD INSECURITY: WITHIN THE PAST 12 MONTHS, YOU WORRIED THAT YOUR FOOD WOULD RUN OUT BEFORE YOU GOT THE MONEY TO BUY MORE.: NEVER TRUE

## 2025-05-12 SDOH — ECONOMIC STABILITY: INCOME INSECURITY: IN THE PAST 12 MONTHS HAS THE ELECTRIC, GAS, OIL, OR WATER COMPANY THREATENED TO SHUT OFF SERVICES IN YOUR HOME?: NO

## 2025-05-12 SDOH — SOCIAL STABILITY: SOCIAL INSECURITY: WITHIN THE LAST YEAR, HAVE YOU BEEN HUMILIATED OR EMOTIONALLY ABUSED IN OTHER WAYS BY YOUR PARTNER OR EX-PARTNER?: NO

## 2025-05-12 SDOH — SOCIAL STABILITY: SOCIAL INSECURITY
WITHIN THE LAST YEAR, HAVE YOU BEEN KICKED, HIT, SLAPPED, OR OTHERWISE PHYSICALLY HURT BY YOUR PARTNER OR EX-PARTNER?: NO

## 2025-05-12 SDOH — SOCIAL STABILITY: SOCIAL NETWORK
DO YOU BELONG TO ANY CLUBS OR ORGANIZATIONS SUCH AS CHURCH GROUPS, UNIONS, FRATERNAL OR ATHLETIC GROUPS, OR SCHOOL GROUPS?: NO

## 2025-05-12 SDOH — SOCIAL STABILITY: SOCIAL INSECURITY: WITHIN THE LAST YEAR, HAVE YOU BEEN AFRAID OF YOUR PARTNER OR EX-PARTNER?: NO

## 2025-05-12 SDOH — HEALTH STABILITY: PHYSICAL HEALTH: ON AVERAGE, HOW MANY MINUTES DO YOU ENGAGE IN EXERCISE AT THIS LEVEL?: 0 MIN

## 2025-05-12 ASSESSMENT — ENCOUNTER SYMPTOMS
ACTIVITY CHANGE: 1
SHORTNESS OF BREATH: 1
PSYCHIATRIC NEGATIVE: 1
HEADACHES: 1
FATIGUE: 1
SINUS PAIN: 1
COUGH: 1
RHINORRHEA: 1
GASTROINTESTINAL NEGATIVE: 1
CHILLS: 1
CARDIOVASCULAR NEGATIVE: 1
MUSCULOSKELETAL NEGATIVE: 1
EYES NEGATIVE: 1
FEVER: 1

## 2025-05-12 ASSESSMENT — PAIN SCALES - GENERAL
PAINLEVEL_OUTOF10: 7
PAINLEVEL_OUTOF10: 7
PAINLEVEL_OUTOF10: 0 - NO PAIN

## 2025-05-12 ASSESSMENT — PAIN - FUNCTIONAL ASSESSMENT
PAIN_FUNCTIONAL_ASSESSMENT: 0-10
PAIN_FUNCTIONAL_ASSESSMENT: 0-10

## 2025-05-12 ASSESSMENT — ACTIVITIES OF DAILY LIVING (ADL)
LACK_OF_TRANSPORTATION: NO
LACK_OF_TRANSPORTATION: NO

## 2025-05-12 NOTE — H&P
History Of Present Illness  Angélica Daniel is a 54 y.o. female presenting with a complaint of SOB. She presented to the ED with a persistent cough, SOB, generalized weakness, and decreased appetite for the past couple of weeks. She had a virtual visit with a provider and was placed o doxycycline and tessalon Perles. She is not feeling better and has a low grade fever. She denies chest pain, N/V/D/C.    In the ED:  VS: T 36.9; HR 90; R 16; /91; SpO2 99% on RA  Labs: Glu 86; Na 138; Cr 3.9; Bun 8cr 0.69; wBC 9.8; Hb 13.3; Hcrt 39.1;   Radiology: CXR No acute cardiopulmonary process. Increased conspicuity of a 1.1 cm ground-glass nodule in the right upper lobe compared to prior chest radiograph in which can be seen on CT chest 12/11/2024  Medication: Duoneb; LR 1,000mL,and stolumedrol  Dispistion: admitted to [facility]     Past Medical History  Medical History[1]    Surgical History  Surgical History[2]     Social History  She reports that she has been smoking cigarettes. She has a 17.5 pack-year smoking history. She has never used smokeless tobacco. She reports current alcohol use of about 1.0 standard drink of alcohol per week. She reports that she does not use drugs.    Family History  Family History[3]     Allergies  Sulfamethoxazole-trimethoprim, Codeine, Other, Nicotine, Nicotine (polacrilex), and Sulfa (sulfonamide antibiotics)    Review of Systems   Constitutional:  Positive for activity change, chills, fatigue and fever.   HENT:  Positive for congestion, rhinorrhea and sinus pain.    Eyes: Negative.    Respiratory:  Positive for cough and shortness of breath.    Cardiovascular: Negative.    Gastrointestinal: Negative.    Genitourinary: Negative.    Musculoskeletal: Negative.    Skin: Negative.    Neurological:  Positive for headaches.   Psychiatric/Behavioral: Negative.          Physical Exam  Vitals reviewed.   Constitutional:       Appearance: Normal appearance. She is normal weight.   HENT:       "Head: Normocephalic and atraumatic.      Right Ear: External ear normal.      Left Ear: External ear normal.      Nose: Nose normal.      Mouth/Throat:      Mouth: Mucous membranes are moist.      Pharynx: Oropharynx is clear.   Eyes:      Conjunctiva/sclera: Conjunctivae normal.      Pupils: Pupils are equal, round, and reactive to light.   Cardiovascular:      Rate and Rhythm: Normal rate and regular rhythm.      Pulses: Normal pulses.      Heart sounds: Normal heart sounds.   Pulmonary:      Effort: Pulmonary effort is normal.      Breath sounds: Rhonchi present.   Abdominal:      General: Bowel sounds are normal.      Palpations: Abdomen is soft.   Musculoskeletal:         General: Normal range of motion.      Cervical back: Normal range of motion and neck supple.   Skin:     General: Skin is warm and dry.   Neurological:      General: No focal deficit present.      Mental Status: She is alert and oriented to person, place, and time.   Psychiatric:         Mood and Affect: Mood normal.         Behavior: Behavior normal.          Last Recorded Vitals  Blood pressure 107/68, pulse 94, temperature 36.6 °C (97.9 °F), temperature source Temporal, resp. rate 17, height 1.753 m (5' 9\"), weight 85.2 kg (187 lb 13.3 oz), SpO2 93%.    Relevant Results    Scheduled medications  Scheduled Medications[4]  Continuous medications  Continuous Medications[5]  PRN medications  PRN Medications[6]    Results for orders placed or performed during the hospital encounter of 05/11/25 (from the past 24 hours)   Sars-CoV-2 and Influenza A/B PCR   Result Value Ref Range    Flu A Result Not Detected Not Detected    Flu B Result Not Detected Not Detected    Coronavirus 2019, PCR Not Detected Not Detected   CBC and Auto Differential   Result Value Ref Range    WBC 9.8 4.4 - 11.3 x10*3/uL    nRBC 0.0 0.0 - 0.0 /100 WBCs    RBC 4.21 4.00 - 5.20 x10*6/uL    Hemoglobin 13.3 12.0 - 16.0 g/dL    Hematocrit 39.1 36.0 - 46.0 %    MCV 93 80 - 100 fL "    MCH 31.6 26.0 - 34.0 pg    MCHC 34.0 32.0 - 36.0 g/dL    RDW 12.2 11.5 - 14.5 %    Platelets 347 150 - 450 x10*3/uL    Neutrophils % 47.1 40.0 - 80.0 %    Immature Granulocytes %, Automated 0.5 0.0 - 0.9 %    Lymphocytes % 42.4 13.0 - 44.0 %    Monocytes % 8.6 2.0 - 10.0 %    Eosinophils % 0.9 0.0 - 6.0 %    Basophils % 0.5 0.0 - 2.0 %    Neutrophils Absolute 4.60 1.20 - 7.70 x10*3/uL    Immature Granulocytes Absolute, Automated 0.05 0.00 - 0.70 x10*3/uL    Lymphocytes Absolute 4.15 1.20 - 4.80 x10*3/uL    Monocytes Absolute 0.84 0.10 - 1.00 x10*3/uL    Eosinophils Absolute 0.09 0.00 - 0.70 x10*3/uL    Basophils Absolute 0.05 0.00 - 0.10 x10*3/uL   Magnesium   Result Value Ref Range    Magnesium 2.01 1.60 - 2.40 mg/dL   Comprehensive metabolic panel   Result Value Ref Range    Glucose 86 74 - 99 mg/dL    Sodium 138 136 - 145 mmol/L    Potassium 3.9 3.5 - 5.3 mmol/L    Chloride 104 98 - 107 mmol/L    Bicarbonate 25 21 - 32 mmol/L    Anion Gap 13 10 - 20 mmol/L    Urea Nitrogen 8 6 - 23 mg/dL    Creatinine 0.69 0.50 - 1.05 mg/dL    eGFR >90 >60 mL/min/1.73m*2    Calcium 9.5 8.6 - 10.3 mg/dL    Albumin 4.1 3.4 - 5.0 g/dL    Alkaline Phosphatase 96 33 - 110 U/L    Total Protein 7.1 6.4 - 8.2 g/dL    AST 17 9 - 39 U/L    Bilirubin, Total 0.2 0.0 - 1.2 mg/dL    ALT 19 7 - 45 U/L   Protime-INR   Result Value Ref Range    Protime 11.5 9.8 - 12.4 seconds    INR 1.0 0.9 - 1.1   Troponin I, High Sensitivity   Result Value Ref Range    Troponin I, High Sensitivity <3 0 - 13 ng/L   B-Type Natriuretic Peptide   Result Value Ref Range    BNP 11 0 - 99 pg/mL   Lactate   Result Value Ref Range    Lactate 1.1 0.4 - 2.0 mmol/L               Assessment & Plan  COPD exacerbation (Multi)    Bronchitis    Community acquired bacterial pneumonia    Chronic GERD    Tobacco use      Community acquired bacterial pneumonia  COPD exacerbation  - supplemental oxygen to keep SpO2 > 90%  - currently on RA  - No oxygen at home  - given  azithromycin in the eD  - continue azithromycin 500 mg daily; day 2  - started ceftriaxone 2g daily; day 1  - started duoneb 0.5-2.5 mg TID  - started solumedrol 40 mg q8h  - started guaifenesin 600 mg BID, tessalon perles 200 mg TID  - RT for bronchial hygiene  - CTA chest: negative for PE; Bandlike and reticular opacities   within the dependent bibasilar lower lobes     Chronic GERD  - continue famotidine 40 mg daily    Tobacco use  - smoking cessation offered  - started lorazepam 0.5 mg q8h  - patient allergic to the adhesive in the nicotine patch    DVT ppx  - started enoxaparin 40 mg daily    Code status: Full    Dispostion: Patient requires more than 2 inpatient days.           ESTEPHANIA Gonzalez-CNP         [1]   Past Medical History:  Diagnosis Date    ADHD     Anxiety    [2]   Past Surgical History:  Procedure Laterality Date    TRIGGER FINGER RELEASE Left 02/2024    thumb   [3]   Family History  Problem Relation Name Age of Onset    No Known Problems Mother          brain atrophy    Heart disease Father      Diverticulitis Father      Diabetes Father      Diverticulitis Sister      No Known Problems Brother      No Known Problems Son      No Known Problems Son      No Known Problems Son     [4] amphetamine-dextroamphetamine XR, 30 mg, oral, Daily  azithromycin, 500 mg, oral, q24h CRISTIANA  enoxaparin, 40 mg, subcutaneous, Daily  famotidine, 40 mg, oral, Daily  ipratropium-albuteroL, 3 mL, nebulization, TID  methylPREDNISolone sodium succinate (PF), 40 mg, intravenous, q8h  [5]    [6] PRN medications: acetaminophen, albuterol, alum-mag hydroxide-simeth, benzocaine-menthol, guaiFENesin, melatonin, ondansetron, polyethylene glycol

## 2025-05-12 NOTE — PROGRESS NOTES
Pharmacy Medication History Review    Angélica Daniel is a 54 y.o. female admitted for COPD exacerbation (Multi). Pharmacy reviewed the patient's [id number]-[id number]-wxaomsaah medications and allergies for accuracy.    Sources used to confirm medication list: Informant interview  Informant: Patient  Informant credibility: Good (Able to recall medication, indication, strength, frequency, and/or prescriber for >75% of medications).    Total number of adjustments: 6     Medications Added Medications Removed Medications Adjusted  Adjustments Made    Adderall XR (X3 duplicates) Albuterol Nebulizer Solution Q4H PRN --> Q6H PRN    Cyclobenzaprine Xiidra BID --> BID PRN     The list below reflectives the updated PTA list. Please review each medication in order reconciliation for additional clarification and justification.  Medications Prior to Admission   Medication Sig Dispense Refill Last Dose/Taking    albuterol 2.5 mg /3 mL (0.083 %) nebulizer solution Take 3 mL (2.5 mg) by nebulization every 6 hours if needed for wheezing.   Unknown    albuterol 90 mcg/actuation inhaler Inhale 2 puffs every 6 hours if needed for wheezing. 18 g 0 Unknown    amphetamine-dextroamphetamine XR (Adderall XR) 30 mg 24 hr capsule Take 1 capsule (30 mg) by mouth once daily. Do not fill before November 20, 2024. 30 capsule 0     amphetamine-dextroamphetamine XR (Adderall XR) 30 mg 24 hr capsule Take 1 capsule (30 mg) by mouth once daily. Do not fill before December 20, 2024. 30 capsule 0     amphetamine-dextroamphetamine XR (Adderall XR) 30 mg 24 hr capsule Take 1 capsule (30 mg) by mouth once daily. 30 capsule 0     amphetamine-dextroamphetamine XR (Adderall XR) 30 mg 24 hr capsule Take 1 capsule (30 mg) by mouth once daily. Do not fill before May 2, 2025. 30 capsule 0 Unknown    [START ON 6/1/2025] amphetamine-dextroamphetamine XR (Adderall XR) 30 mg 24 hr capsule Take 1 capsule (30 mg) by mouth once daily. Do not fill before June 1, 2025. 30 capsule 0  Unknown    benzonatate (Tessalon) 200 mg capsule Take 1 capsule (200 mg) by mouth 3 times a day as needed for cough. Do not crush or chew. 21 capsule 0 Unknown    cyclobenzaprine (Flexeril) 10 mg tablet Take 1 tablet (10 mg) by mouth 3 times a day as needed for muscle spasms for up to 5 days. (Patient not taking: Reported on 5/12/2025) 15 tablet 0 Not Taking    doxycycline (Vibra-Tabs) 100 mg tablet Take 1 tablet (100 mg) by mouth 2 times a day for 10 days. Take with a full glass of water and do not lie down for at least 30 minutes after. 20 tablet 0 Unknown    famotidine (Pepcid) 40 mg tablet Take 1 tablet (40 mg) by mouth once daily as needed for heartburn or indigestion. 90 tablet 3 Unknown    Xiidra 5 % dropperette Administer 1 drop into both eyes 2 times a day as needed (for dry eyes.).   Unknown        The list below reflectives the updated allergy list. Please review each documented allergy for additional clarification and justification.  Allergies  Reviewed by Ashley Sarmiento RN on 5/12/2025        Severity Reactions Comments    Sulfamethoxazole-trimethoprim High Hives, Unknown     Codeine Medium Itching, Unknown     Nicotine Low Unknown     Nicotine (polacrilex) Low Rash     Sulfa (sulfonamide Antibiotics) Low Hives, Itching, Rash             Below are additional concerns with the patient's PTA list.  Spoke w/pt at bedside. Reviewed PTA and allergy list. Please review changes made to the PTA list in the chart above.    Sharmin Tafoya CPhT  Medication History Pharmacy Technician  JUSTIN 8-4:30  Available via [employer] Secure Chat  OR  [phone]

## 2025-05-12 NOTE — CONSULTS
Reason For Consult  COPD NAVIGATOR    History Of Present Illness  Angélica Daniel is a 54 y.o. female   COPD navigator material was provided to the patient. The patient declined answering questions at this time. She plan to see Dr. Bustillo on discharge and get a PFT scheduled at that time .    Nellie Bright, RRT

## 2025-05-12 NOTE — ED PROVIDER NOTES
HPI   Chief Complaint   Patient presents with    Flu Symptoms       CC: Cough, shortness of breath, generalized weakness  HPI:   54-year-old female presents ED complaining of persistent cough, shortness of breath, generalized weakness decreased appetite progressing over the past couple weeks.  She is a moderate long-term tobacco user, denies any hemoptysis or hematemesis hematochezia   She denies any fevers, denies diarrhea.  A couple of days ago she saw a virtual provider that placed her on doxycycline and Tessalon Perles.  She reports subjective low-grade fevers at home.  Denies any chest pain abdominal pain or back pain.    Additional Limitations to History:   External Records Reviewed: I reviewed recent and relevant outside records including   History Obtained From:     Past Medical History: Per HPI  Medications: Reviewed in EMR and with patient  Allergies:  Reviewed in EMR  Past Surgical History:   Social History:     ------------------------------------------------------------------------------------------------------  Physical Exam:  --Vital signs reviewed in nursing triage note, EMR flow sheets, and at patient's bedside  GEN:  A&Ox3, no acute distress, appears comfortable.  Conversational and appropriate.  No confusion or gross mental status changes.  EYES: EOMI, non-injected sclera.  ENT: Moist mucous membranes, no apparent injuries or lesions.   CARDIO: Normal rate and regular rhythm. No murmurs, rubs, or gallops.  2+ equal pulses of the distal extremities.   PULM: Diffuse upper bilateral wheezing, mild rhonchus in the lower lobes. Good symmetric chest expansion.  GI: Soft, non-tender, non-distended. No rebound tenderness or guarding.  SKIN: Warm and dry, no rashes or lesions.  MSK: ROM intact the extremities without contractures.   EXT: No peripheral edema, contusions, or wounds.   NEURO: Cranial nerves II-XII grossly intact. Sensation to light touch intact and equal bilaterally in upper and lower  extremities.  Symmetric 5/5 strength in upper and lower extremities.  PSYCH: Appropriate mood and behavior, converses and responds appropriately during exam.  -------------------------------------------------------------------------------------------------------        Differential Diagnoses Considered:   Chronic Medical Conditions Significantly Affecting Care:   Diagnostic testing considered: [PERC, D-Dimer, PECARN, etc.]    - EKG interpreted by myself normal sinus rhythm ventricular rate 84 MA interval 144 normal QRS duration no prolonged QT/QTc no obvious ST elevation, depression or acute ischemic findings.  - I independently interpreted: [CXR, CT, POCUS, etc. including your interpretation]  - Labs notable for     Escalation of Care: Appropriate for   Social Determinants of Health Significantly Affecting Care: [Homelessness, lacking transportation, uninsured, unable to afford medications]  Prescription Drug Consideration: [Antibiotics, antivirals, pain medications, etc.]  Discussion of Management with Other Providers:  I discussed the patient/results with: [admitting team, consultant, radiologist, social work, EPAT, case management, PT/OT, RT, PCP, etc.]      Domenico Almonte PA-C              Patient History   Medical History[1]  Surgical History[2]  Family History[3]  Social History[4]    Physical Exam   ED Triage Vitals [05/11/25 1933]   Temperature Heart Rate Respirations BP   36.9 °C (98.4 °F) 90 16 (!) 146/91      Pulse Ox Temp Source Heart Rate Source Patient Position   99 % Temporal Monitor Sitting      BP Location FiO2 (%)     Right arm --       Physical Exam      ED Course & MDM   Diagnoses as of 05/14/25 1101   Bronchitis   Failure of outpatient treatment                 No data recorded     Antonia Coma Scale Score: 15 (05/11/25 1934 : Marleen Benavides, RN)                           Medical Decision Making  54-year-old female with cough, shortness of breath, likely in the setting of COPD exacerbation  and possible underlying superimposed pneumonia, she was given IV fluids albuterol, DuoNeb, nebulizers, discussed with the oncoming ED attending in turnover for final disposition.        Procedure  Procedures       Domenico Almonte PA-C  05/11/25 2041         Domenico Almonte PA-C  05/11/25 2115       [1]   Past Medical History:  Diagnosis Date    ADHD     Anxiety    [2]   Past Surgical History:  Procedure Laterality Date    TRIGGER FINGER RELEASE Left 02/2024    thumb   [3]   Family History  Problem Relation Name Age of Onset    No Known Problems Mother          brain atrophy    Heart disease Father      Diverticulitis Father      Diabetes Father      Diverticulitis Sister      No Known Problems Brother      No Known Problems Son      No Known Problems Son      No Known Problems Son     [4]   Social History  Tobacco Use    Smoking status: Every Day     Current packs/day: 0.50     Average packs/day: 0.5 packs/day for 35.0 years (17.5 ttl pk-yrs)     Types: Cigarettes    Smokeless tobacco: Never   Vaping Use    Vaping status: Every Day   Substance Use Topics    Alcohol use: Yes     Alcohol/week: 1.0 standard drink of alcohol     Types: 1 Glasses of wine per week     Comment: occ    Drug use: Never        Domenico Almonte PA-C  05/14/25 1106     no

## 2025-05-12 NOTE — PROGRESS NOTES
05/12/25 1233   Discharge Planning   Living Arrangements Spouse/significant other   Support Systems Spouse/significant other   Assistance Needed Patient is independent with ADL's and iADL's, denies the use of assistive devices. Drives and works. Denies financial difficulty.   Type of Residence Private residence   Number of Stairs to Enter Residence 1   Number of Stairs Within Residence 0   Who is requesting discharge planning? Provider   Home or Post Acute Services None   Expected Discharge Disposition Home  (Patient denies the need for assistance upon discharge.)   Does the patient need discharge transport arranged? No   Financial Resource Strain   How hard is it for you to pay for the very basics like food, housing, medical care, and heating? Not hard   Housing Stability   In the last 12 months, was there a time when you were not able to pay the mortgage or rent on time? N   In the past 12 months, how many times have you moved where you were living? 0   At any time in the past 12 months, were you homeless or living in a shelter (including now)? N   Transportation Needs   In the past 12 months, has lack of transportation kept you from medical appointments or from getting medications? no   In the past 12 months, has lack of transportation kept you from meetings, work, or from getting things needed for daily living? No   Stroke Family Assessment   Stroke Family Assessment Needed No   Intensity of Service   Intensity of Service 0-30 min

## 2025-05-13 LAB
ANION GAP SERPL CALC-SCNC: 14 MMOL/L (ref 10–20)
BUN SERPL-MCNC: 9 MG/DL (ref 6–23)
CALCIUM SERPL-MCNC: 9.4 MG/DL (ref 8.6–10.3)
CHLORIDE SERPL-SCNC: 105 MMOL/L (ref 98–107)
CO2 SERPL-SCNC: 24 MMOL/L (ref 21–32)
CREAT SERPL-MCNC: 0.64 MG/DL (ref 0.5–1.05)
EGFRCR SERPLBLD CKD-EPI 2021: >90 ML/MIN/1.73M*2
ERYTHROCYTE [DISTWIDTH] IN BLOOD BY AUTOMATED COUNT: 12.5 % (ref 11.5–14.5)
GLUCOSE SERPL-MCNC: 150 MG/DL (ref 74–99)
HCT VFR BLD AUTO: 34.6 % (ref 36–46)
HGB BLD-MCNC: 11.5 G/DL (ref 12–16)
MCH RBC QN AUTO: 31.4 PG (ref 26–34)
MCHC RBC AUTO-ENTMCNC: 33.2 G/DL (ref 32–36)
MCV RBC AUTO: 95 FL (ref 80–100)
NRBC BLD-RTO: 0 /100 WBCS (ref 0–0)
PLATELET # BLD AUTO: 321 X10*3/UL (ref 150–450)
POTASSIUM SERPL-SCNC: 4.2 MMOL/L (ref 3.5–5.3)
RBC # BLD AUTO: 3.66 X10*6/UL (ref 4–5.2)
SODIUM SERPL-SCNC: 139 MMOL/L (ref 136–145)
WBC # BLD AUTO: 18.5 X10*3/UL (ref 4.4–11.3)

## 2025-05-13 PROCEDURE — 94640 AIRWAY INHALATION TREATMENT: CPT

## 2025-05-13 PROCEDURE — 2500000001 HC RX 250 WO HCPCS SELF ADMINISTERED DRUGS (ALT 637 FOR MEDICARE OP): Performed by: NURSE PRACTITIONER

## 2025-05-13 PROCEDURE — 94760 N-INVAS EAR/PLS OXIMETRY 1: CPT

## 2025-05-13 PROCEDURE — 2500000002 HC RX 250 W HCPCS SELF ADMINISTERED DRUGS (ALT 637 FOR MEDICARE OP, ALT 636 FOR OP/ED): Mod: JZ | Performed by: INTERNAL MEDICINE

## 2025-05-13 PROCEDURE — 2500000002 HC RX 250 W HCPCS SELF ADMINISTERED DRUGS (ALT 637 FOR MEDICARE OP, ALT 636 FOR OP/ED): Mod: JZ | Performed by: HOSPITALIST

## 2025-05-13 PROCEDURE — 2500000004 HC RX 250 GENERAL PHARMACY W/ HCPCS (ALT 636 FOR OP/ED): Performed by: NURSE PRACTITIONER

## 2025-05-13 PROCEDURE — 36415 COLL VENOUS BLD VENIPUNCTURE: CPT | Performed by: NURSE PRACTITIONER

## 2025-05-13 PROCEDURE — 85027 COMPLETE CBC AUTOMATED: CPT | Performed by: NURSE PRACTITIONER

## 2025-05-13 PROCEDURE — 99232 SBSQ HOSP IP/OBS MODERATE 35: CPT | Performed by: NURSE PRACTITIONER

## 2025-05-13 PROCEDURE — 2500000002 HC RX 250 W HCPCS SELF ADMINISTERED DRUGS (ALT 637 FOR MEDICARE OP, ALT 636 FOR OP/ED): Performed by: HOSPITALIST

## 2025-05-13 PROCEDURE — G0378 HOSPITAL OBSERVATION PER HR: HCPCS

## 2025-05-13 PROCEDURE — 2500000001 HC RX 250 WO HCPCS SELF ADMINISTERED DRUGS (ALT 637 FOR MEDICARE OP): Performed by: HOSPITALIST

## 2025-05-13 PROCEDURE — 96376 TX/PRO/DX INJ SAME DRUG ADON: CPT

## 2025-05-13 PROCEDURE — 82374 ASSAY BLOOD CARBON DIOXIDE: CPT | Performed by: NURSE PRACTITIONER

## 2025-05-13 PROCEDURE — 96372 THER/PROPH/DIAG INJ SC/IM: CPT | Performed by: HOSPITALIST

## 2025-05-13 PROCEDURE — 2500000004 HC RX 250 GENERAL PHARMACY W/ HCPCS (ALT 636 FOR OP/ED): Mod: JZ | Performed by: HOSPITALIST

## 2025-05-13 PROCEDURE — 2500000001 HC RX 250 WO HCPCS SELF ADMINISTERED DRUGS (ALT 637 FOR MEDICARE OP): Performed by: STUDENT IN AN ORGANIZED HEALTH CARE EDUCATION/TRAINING PROGRAM

## 2025-05-13 RX ORDER — CEFUROXIME AXETIL 250 MG/1
250 TABLET ORAL 2 TIMES DAILY
Qty: 12 TABLET | Refills: 0 | Status: SHIPPED | OUTPATIENT
Start: 2025-05-13 | End: 2025-05-19

## 2025-05-13 RX ORDER — PREDNISONE 20 MG/1
20 TABLET ORAL DAILY
Qty: 10 TABLET | Refills: 0 | Status: SHIPPED | OUTPATIENT
Start: 2025-05-19

## 2025-05-13 RX ORDER — PREDNISONE 20 MG/1
20 TABLET ORAL DAILY
Status: DISCONTINUED | OUTPATIENT
Start: 2025-05-19 | End: 2025-05-14 | Stop reason: HOSPADM

## 2025-05-13 RX ORDER — BENZONATATE 200 MG/1
200 CAPSULE ORAL 3 TIMES DAILY
Qty: 15 CAPSULE | Refills: 0 | Status: SHIPPED | OUTPATIENT
Start: 2025-05-13 | End: 2025-05-14

## 2025-05-13 RX ORDER — PREDNISONE 20 MG/1
40 TABLET ORAL DAILY
Qty: 6 TABLET | Refills: 0 | Status: SHIPPED | OUTPATIENT
Start: 2025-05-13 | End: 2025-05-16

## 2025-05-13 RX ORDER — PREDNISONE 20 MG/1
40 TABLET ORAL DAILY
Status: DISCONTINUED | OUTPATIENT
Start: 2025-05-13 | End: 2025-05-14 | Stop reason: HOSPADM

## 2025-05-13 RX ORDER — PREDNISONE 10 MG/1
10 TABLET ORAL DAILY
Qty: 3 TABLET | Refills: 0 | Status: SHIPPED | OUTPATIENT
Start: 2025-05-22 | End: 2025-05-25

## 2025-05-13 RX ORDER — CEFUROXIME AXETIL 250 MG/1
250 TABLET ORAL 2 TIMES DAILY
Status: DISCONTINUED | OUTPATIENT
Start: 2025-05-13 | End: 2025-05-14 | Stop reason: HOSPADM

## 2025-05-13 RX ORDER — IPRATROPIUM BROMIDE AND ALBUTEROL SULFATE 2.5; .5 MG/3ML; MG/3ML
3 SOLUTION RESPIRATORY (INHALATION)
Status: DISCONTINUED | OUTPATIENT
Start: 2025-05-13 | End: 2025-05-14 | Stop reason: HOSPADM

## 2025-05-13 RX ORDER — AZITHROMYCIN 250 MG/1
TABLET, FILM COATED ORAL
Qty: 6 TABLET | Refills: 0 | Status: SHIPPED | OUTPATIENT
Start: 2025-05-14

## 2025-05-13 RX ORDER — PREDNISONE 5 MG/1
10 TABLET ORAL DAILY
Status: DISCONTINUED | OUTPATIENT
Start: 2025-05-22 | End: 2025-05-14 | Stop reason: HOSPADM

## 2025-05-13 RX ORDER — PREDNISONE 10 MG/1
30 TABLET ORAL DAILY
Qty: 9 TABLET | Refills: 0 | Status: SHIPPED | OUTPATIENT
Start: 2025-05-16 | End: 2025-05-19

## 2025-05-13 RX ADMIN — GUAIFENESIN 200 MG: 200 SOLUTION ORAL at 21:21

## 2025-05-13 RX ADMIN — IPRATROPIUM BROMIDE AND ALBUTEROL SULFATE 3 ML: .5; 3 SOLUTION RESPIRATORY (INHALATION) at 08:58

## 2025-05-13 RX ADMIN — BENZOCAINE AND MENTHOL 1 LOZENGE: 15; 3.6 LOZENGE ORAL at 21:21

## 2025-05-13 RX ADMIN — OXYCODONE 5 MG: 5 TABLET ORAL at 10:34

## 2025-05-13 RX ADMIN — AZITHROMYCIN DIHYDRATE 500 MG: 250 TABLET ORAL at 08:30

## 2025-05-13 RX ADMIN — IPRATROPIUM BROMIDE AND ALBUTEROL SULFATE 3 ML: .5; 3 SOLUTION RESPIRATORY (INHALATION) at 15:42

## 2025-05-13 RX ADMIN — GUAIFENESIN 600 MG: 600 TABLET, EXTENDED RELEASE ORAL at 21:21

## 2025-05-13 RX ADMIN — NICOTINE POLACRILEX 4 MG: 2 GUM, CHEWING BUCCAL at 21:21

## 2025-05-13 RX ADMIN — FAMOTIDINE 40 MG: 20 TABLET, FILM COATED ORAL at 08:29

## 2025-05-13 RX ADMIN — Medication 10 MG: at 21:21

## 2025-05-13 RX ADMIN — BENZONATATE 200 MG: 100 CAPSULE ORAL at 17:23

## 2025-05-13 RX ADMIN — ALBUTEROL SULFATE 2.5 MG: 2.5 SOLUTION RESPIRATORY (INHALATION) at 11:06

## 2025-05-13 RX ADMIN — OXYCODONE 5 MG: 5 TABLET ORAL at 21:22

## 2025-05-13 RX ADMIN — NICOTINE POLACRILEX 4 MG: 2 GUM, CHEWING BUCCAL at 08:47

## 2025-05-13 RX ADMIN — CEFUROXIME AXETIL 250 MG: 250 TABLET, FILM COATED ORAL at 21:21

## 2025-05-13 RX ADMIN — BENZONATATE 200 MG: 100 CAPSULE ORAL at 21:21

## 2025-05-13 RX ADMIN — LORAZEPAM 0.5 MG: 0.5 TABLET ORAL at 05:51

## 2025-05-13 RX ADMIN — GUAIFENESIN 600 MG: 600 TABLET, EXTENDED RELEASE ORAL at 08:29

## 2025-05-13 RX ADMIN — LORAZEPAM 0.5 MG: 0.5 TABLET ORAL at 16:11

## 2025-05-13 RX ADMIN — PREDNISONE 40 MG: 20 TABLET ORAL at 12:48

## 2025-05-13 RX ADMIN — GUAIFENESIN 200 MG: 200 SOLUTION ORAL at 04:49

## 2025-05-13 RX ADMIN — ENOXAPARIN SODIUM 40 MG: 40 INJECTION SUBCUTANEOUS at 08:30

## 2025-05-13 RX ADMIN — METHYLPREDNISOLONE SODIUM SUCCINATE 40 MG: 40 INJECTION, POWDER, FOR SOLUTION INTRAMUSCULAR; INTRAVENOUS at 05:29

## 2025-05-13 RX ADMIN — NICOTINE POLACRILEX 4 MG: 2 GUM, CHEWING BUCCAL at 16:11

## 2025-05-13 RX ADMIN — IPRATROPIUM BROMIDE AND ALBUTEROL SULFATE 3 ML: .5; 3 SOLUTION RESPIRATORY (INHALATION) at 20:27

## 2025-05-13 RX ADMIN — BENZOCAINE AND MENTHOL 1 LOZENGE: 15; 3.6 LOZENGE ORAL at 04:49

## 2025-05-13 RX ADMIN — GUAIFENESIN 200 MG: 200 SOLUTION ORAL at 10:59

## 2025-05-13 RX ADMIN — BENZONATATE 200 MG: 100 CAPSULE ORAL at 08:29

## 2025-05-13 RX ADMIN — OXYCODONE 5 MG: 5 TABLET ORAL at 16:12

## 2025-05-13 RX ADMIN — OXYCODONE 5 MG: 5 TABLET ORAL at 05:51

## 2025-05-13 RX ADMIN — CEFUROXIME AXETIL 250 MG: 250 TABLET, FILM COATED ORAL at 12:48

## 2025-05-13 ASSESSMENT — COGNITIVE AND FUNCTIONAL STATUS - GENERAL
DAILY ACTIVITIY SCORE: 24
MOBILITY SCORE: 24

## 2025-05-13 ASSESSMENT — PAIN SCALES - GENERAL
PAINLEVEL_OUTOF10: 5 - MODERATE PAIN
PAINLEVEL_OUTOF10: 2

## 2025-05-13 ASSESSMENT — PAIN DESCRIPTION - LOCATION: LOCATION: RIB CAGE

## 2025-05-13 ASSESSMENT — PAIN - FUNCTIONAL ASSESSMENT
PAIN_FUNCTIONAL_ASSESSMENT: 0-10
PAIN_FUNCTIONAL_ASSESSMENT: 0-10

## 2025-05-13 NOTE — PROGRESS NOTES
05/13/25 1101   Discharge Planning   Who is requesting discharge planning? Provider   Home or Post Acute Services None   Expected Discharge Disposition Home  (Aware she is being discharged home today. Denies the need for assistance upon discharge. Does need return to work slip.)   Does the patient need discharge transport arranged? No

## 2025-05-13 NOTE — CARE PLAN
The patient's goals for the shift include      The clinical goals for the shift include Coughing to decrease    SpO2 remained > 90% room air. Cough increased with anxiety. Cough controlled with Oxycodone and Ativan per providers orders.

## 2025-05-13 NOTE — PROGRESS NOTES
"Angélica Daniel is a 54 y.o. female on day 0 of admission presenting with COPD exacerbation (Multi).      Subjective   \"I have a lot of pain and its hard to rest\" - Denies sob, n/v/c/d.       Objective     Last Recorded Vitals  /62 (BP Location: Right arm, Patient Position: Lying)   Pulse 93   Temp 36.7 °C (98.1 °F) (Temporal)   Resp 16   Wt 85.2 kg (187 lb 13.3 oz)   SpO2 96%   Intake/Output last 3 Shifts:    Intake/Output Summary (Last 24 hours) at 5/13/2025 1100  Last data filed at 5/12/2025 2040  Gross per 24 hour   Intake 1050 ml   Output --   Net 1050 ml       Admission Weight  Weight: 83.9 kg (185 lb) (05/11/25 1933)    Daily Weight  05/11/25 : 85.2 kg (187 lb 13.3 oz)    Image Results  CT chest w IV contrast  Narrative: Interpreted By:  Kole Mcfarland,   STUDY:  CT CHEST W IV CONTRAST;  5/12/2025 7:51 am      INDICATION:  Signs/Symptoms:Right upper lobe nodule.      COMPARISON:  CT chest abdomen and pelvis 12/11/2024      ACCESSION NUMBER(S):  [id number]      ORDERING CLINICIAN:  SYLVIA CERNA      TECHNIQUE:  Contiguous thin section axial CT imaging of the chest was performed  following intravenous administration of 50 ML Omnipaque 350. Images  were reformatted in axial, coronal, and sagittal planes.      FINDINGS:  THORACIC INLET:  Visualized thyroid gland within normal limits.      HEART:  Heart is normal in size. No pericardial effusion.      VESSELS:  Aorta and pulmonary arteries are normal caliber. No coronary artery  calcification.      MEDIASTINUM AND GATITO:  No mediastinal or hilar lymphadenopathy.      LUNG/PLEURA/LARGE AIRWAYS:  Ill-defined ground-glass opacities most conspicuous in the upper  lobes in the comparison CT are no longer evident. No suspicious  pulmonary nodules. Negative for pneumonia. No pulmonary edema. No  pneumothorax or pleural effusion. Bandlike and reticular opacities  within the dependent bibasilar lower lobes compatible with  atelectasis/scarring.    "   MUSCULOSKELETAL:  No acute fracture or suspicious osseous lesion.      UPPER ABDOMEN:  No acute abnormality in the visualized upper abdomen.      Impression: No suspicious pulmonary nodules.      Interval resolution of scattered ground-glass opacities concerning  for multifocal/atypical pneumonia.      Mild bibasilar atelectasis/scarring.          MACRO:  None      Signed by: Kole Mcfarland 5/12/2025 8:56 AM  Dictation workstation:   [id number]      Physical Exam  Constitutional:       Appearance: She is ill-appearing.   HENT:      Head: Normocephalic.      Nose: Nose normal.      Mouth/Throat:      Mouth: Mucous membranes are moist.   Cardiovascular:      Rate and Rhythm: Normal rate and regular rhythm.      Heart sounds: Normal heart sounds.   Pulmonary:      Effort: Pulmonary effort is normal.      Breath sounds: Rhonchi present.   Abdominal:      Palpations: Abdomen is soft.   Musculoskeletal:      Cervical back: Normal range of motion.   Skin:     General: Skin is warm and dry.      Capillary Refill: Capillary refill takes less than 2 seconds.   Neurological:      General: No focal deficit present.      Mental Status: She is alert and oriented to person, place, and time.   Psychiatric:         Behavior: Behavior normal.      Comments: Highly anxious          Relevant Results             Scheduled medications  Scheduled Medications[1]  Continuous medications  Continuous Medications[2]  PRN medications  PRN Medications[3]  Results for orders placed or performed during the hospital encounter of 05/11/25 (from the past 24 hours)   Basic Metabolic Panel   Result Value Ref Range    Glucose 150 (H) 74 - 99 mg/dL    Sodium 139 136 - 145 mmol/L    Potassium 4.2 3.5 - 5.3 mmol/L    Chloride 105 98 - 107 mmol/L    Bicarbonate 24 21 - 32 mmol/L    Anion Gap 14 10 - 20 mmol/L    Urea Nitrogen 9 6 - 23 mg/dL    Creatinine 0.64 0.50 - 1.05 mg/dL    eGFR >90 >60 mL/min/1.73m*2    Calcium 9.4 8.6 - 10.3 mg/dL   CBC   Result  Value Ref Range    WBC 18.5 (H) 4.4 - 11.3 x10*3/uL    nRBC 0.0 0.0 - 0.0 /100 WBCs    RBC 3.66 (L) 4.00 - 5.20 x10*6/uL    Hemoglobin 11.5 (L) 12.0 - 16.0 g/dL    Hematocrit 34.6 (L) 36.0 - 46.0 %    MCV 95 80 - 100 fL    MCH 31.4 26.0 - 34.0 pg    MCHC 33.2 32.0 - 36.0 g/dL    RDW 12.5 11.5 - 14.5 %    Platelets 321 150 - 450 x10*3/uL     CT chest w IV contrast  Result Date: 5/12/2025  Interpreted By:  Kole Mcfarland, STUDY: CT CHEST W IV CONTRAST;  5/12/2025 7:51 am   INDICATION: Signs/Symptoms:Right upper lobe nodule.   COMPARISON: CT chest abdomen and pelvis 12/11/2024   ACCESSION NUMBER(S): [id number]   ORDERING CLINICIAN: SYLVIA CERNA   TECHNIQUE: Contiguous thin section axial CT imaging of the chest was performed following intravenous administration of 50 ML Omnipaque 350. Images were reformatted in axial, coronal, and sagittal planes.   FINDINGS: THORACIC INLET: Visualized thyroid gland within normal limits.   HEART: Heart is normal in size. No pericardial effusion.   VESSELS: Aorta and pulmonary arteries are normal caliber. No coronary artery calcification.   MEDIASTINUM AND GATITO: No mediastinal or hilar lymphadenopathy.   LUNG/PLEURA/LARGE AIRWAYS: Ill-defined ground-glass opacities most conspicuous in the upper lobes in the comparison CT are no longer evident. No suspicious pulmonary nodules. Negative for pneumonia. No pulmonary edema. No pneumothorax or pleural effusion. Bandlike and reticular opacities within the dependent bibasilar lower lobes compatible with atelectasis/scarring.   MUSCULOSKELETAL: No acute fracture or suspicious osseous lesion.   UPPER ABDOMEN: No acute abnormality in the visualized upper abdomen.       No suspicious pulmonary nodules.   Interval resolution of scattered ground-glass opacities concerning for multifocal/atypical pneumonia.   Mild bibasilar atelectasis/scarring.     MACRO: None   Signed by: Kole Mcfarland 5/12/2025 8:56 AM Dictation workstation:   [id number]    XR  chest 1 view  Result Date: 5/11/2025  Interpreted By:  Wayne Elizabeth, STUDY: XR CHEST 1 VIEW;  5/11/2025 8:44 pm   INDICATION: Signs/Symptoms:cough, sob, weakness.     COMPARISON: 12/09/2024   ACCESSION NUMBER(S): [id number]   ORDERING CLINICIAN: BERT HOLLY   FINDINGS:     The cardiomediastinal silhouette and pulmonary vasculature are within normal limits. There is hyperlucency in the upper lung zones consistent with advanced emphysema. In the right upper lobe there is a 1.1 cm nodule known to represent a ground-glass nodule on CT chest 12/11/2024. This is more conspicuous compared to the CXR 12/09/2024.   No consolidation, pleural effusion, or pneumothorax.       No acute cardiopulmonary process.   Increased conspicuity of a 1.1 cm ground-glass nodule in the right upper lobe compared to prior chest radiograph in which can be seen on CT chest 12/11/2024. Given persistence of the possibly of a low-grade adenocarcinoma is not excluded and recommend follow-up with pulmonology and repeat nonemergent chest CT.   MACRO: None.   Signed by: Wayne Elizabeth 5/11/2025 9:14 PM Dictation workstation:   [id number]         Assessment & Plan  COPD exacerbation (Multi)    Bronchitis    Community acquired bacterial pneumonia    Chronic GERD    Tobacco use    Community acquired bacterial pneumonia  COPD exacerbation  - supplemental oxygen to keep SpO2 > 90%  - currently on RA  - No oxygen at home  - given azithromycin in the eD  - continue azithromycin 500 mg daily; day 3  - started ceftriaxone 2g daily; day 1- switched to cefuroxime   - started duoneb 0.5-2.5 mg TID  - started solumedrol 40 mg q8h - to prednisone taper   - started guaifenesin 600 mg BID, tessalon perles 200 mg TID  - RT for bronchial hygiene  - CTA chest: negative for PE; Bandlike and reticular opacities   within the dependent bibasilar lower lobes      Chronic GERD  - continue famotidine 40 mg daily     Tobacco use  - smoking cessation offered  -  started lorazepam 0.5 mg q8h  - patient allergic to the adhesive in the nicotine patch     DVT ppx  - started enoxaparin 40 mg daily     Code status: Full     Dispostion: Patient requires more than 2 inpatient days.           ESTEPHANIA Denson-CNP           [1] [Held by provider] amphetamine-dextroamphetamine XR, 30 mg, oral, Daily  azithromycin, 500 mg, oral, q24h CRISTIANA  benzonatate, 200 mg, oral, TID  cefuroxime, 250 mg, oral, BID  enoxaparin, 40 mg, subcutaneous, Daily  famotidine, 40 mg, oral, Daily  guaiFENesin, 600 mg, oral, BID  ipratropium-albuteroL, 3 mL, nebulization, TID  [START ON 5/22/2025] predniSONE, 10 mg, oral, Daily  [START ON 5/19/2025] predniSONE, 20 mg, oral, Daily  [START ON 5/16/2025] predniSONE, 30 mg, oral, Daily  predniSONE, 40 mg, oral, Daily  [2]    [3] PRN medications: acetaminophen, albuterol, alum-mag hydroxide-simeth, benzocaine-menthol, guaiFENesin, ibuprofen, LORazepam, melatonin, nicotine polacrilex, ondansetron, oxyCODONE, polyethylene glycol

## 2025-05-14 VITALS
WEIGHT: 187.83 LBS | OXYGEN SATURATION: 96 % | BODY MASS INDEX: 27.82 KG/M2 | TEMPERATURE: 97.9 F | HEIGHT: 69 IN | SYSTOLIC BLOOD PRESSURE: 126 MMHG | RESPIRATION RATE: 18 BRPM | HEART RATE: 102 BPM | DIASTOLIC BLOOD PRESSURE: 78 MMHG

## 2025-05-14 LAB
ANION GAP SERPL CALC-SCNC: 14 MMOL/L (ref 10–20)
BUN SERPL-MCNC: 12 MG/DL (ref 6–23)
CALCIUM SERPL-MCNC: 9.1 MG/DL (ref 8.6–10.3)
CHLORIDE SERPL-SCNC: 102 MMOL/L (ref 98–107)
CO2 SERPL-SCNC: 27 MMOL/L (ref 21–32)
CREAT SERPL-MCNC: 0.66 MG/DL (ref 0.5–1.05)
EGFRCR SERPLBLD CKD-EPI 2021: >90 ML/MIN/1.73M*2
ERYTHROCYTE [DISTWIDTH] IN BLOOD BY AUTOMATED COUNT: 12.5 % (ref 11.5–14.5)
GLUCOSE SERPL-MCNC: 95 MG/DL (ref 74–99)
HCT VFR BLD AUTO: 34.5 % (ref 36–46)
HGB BLD-MCNC: 11.9 G/DL (ref 12–16)
MCH RBC QN AUTO: 32.4 PG (ref 26–34)
MCHC RBC AUTO-ENTMCNC: 34.5 G/DL (ref 32–36)
MCV RBC AUTO: 94 FL (ref 80–100)
NRBC BLD-RTO: 0 /100 WBCS (ref 0–0)
PLATELET # BLD AUTO: 316 X10*3/UL (ref 150–450)
POTASSIUM SERPL-SCNC: 3.8 MMOL/L (ref 3.5–5.3)
RBC # BLD AUTO: 3.67 X10*6/UL (ref 4–5.2)
SODIUM SERPL-SCNC: 139 MMOL/L (ref 136–145)
WBC # BLD AUTO: 15.5 X10*3/UL (ref 4.4–11.3)

## 2025-05-14 PROCEDURE — 9420000001 HC RT PATIENT EDUCATION 5 MIN

## 2025-05-14 PROCEDURE — 2500000001 HC RX 250 WO HCPCS SELF ADMINISTERED DRUGS (ALT 637 FOR MEDICARE OP): Performed by: NURSE PRACTITIONER

## 2025-05-14 PROCEDURE — 85027 COMPLETE CBC AUTOMATED: CPT | Performed by: NURSE PRACTITIONER

## 2025-05-14 PROCEDURE — 2500000002 HC RX 250 W HCPCS SELF ADMINISTERED DRUGS (ALT 637 FOR MEDICARE OP, ALT 636 FOR OP/ED): Mod: JZ | Performed by: INTERNAL MEDICINE

## 2025-05-14 PROCEDURE — 94760 N-INVAS EAR/PLS OXIMETRY 1: CPT

## 2025-05-14 PROCEDURE — 96372 THER/PROPH/DIAG INJ SC/IM: CPT | Performed by: HOSPITALIST

## 2025-05-14 PROCEDURE — 2500000001 HC RX 250 WO HCPCS SELF ADMINISTERED DRUGS (ALT 637 FOR MEDICARE OP): Performed by: HOSPITALIST

## 2025-05-14 PROCEDURE — 94640 AIRWAY INHALATION TREATMENT: CPT

## 2025-05-14 PROCEDURE — 2500000002 HC RX 250 W HCPCS SELF ADMINISTERED DRUGS (ALT 637 FOR MEDICARE OP, ALT 636 FOR OP/ED): Performed by: NURSE PRACTITIONER

## 2025-05-14 PROCEDURE — 2500000004 HC RX 250 GENERAL PHARMACY W/ HCPCS (ALT 636 FOR OP/ED): Mod: JZ | Performed by: HOSPITALIST

## 2025-05-14 PROCEDURE — G0378 HOSPITAL OBSERVATION PER HR: HCPCS

## 2025-05-14 PROCEDURE — 2500000004 HC RX 250 GENERAL PHARMACY W/ HCPCS (ALT 636 FOR OP/ED): Performed by: NURSE PRACTITIONER

## 2025-05-14 PROCEDURE — 80048 BASIC METABOLIC PNL TOTAL CA: CPT | Performed by: NURSE PRACTITIONER

## 2025-05-14 PROCEDURE — 36415 COLL VENOUS BLD VENIPUNCTURE: CPT | Performed by: NURSE PRACTITIONER

## 2025-05-14 PROCEDURE — 2500000001 HC RX 250 WO HCPCS SELF ADMINISTERED DRUGS (ALT 637 FOR MEDICARE OP): Performed by: STUDENT IN AN ORGANIZED HEALTH CARE EDUCATION/TRAINING PROGRAM

## 2025-05-14 RX ORDER — BENZONATATE 200 MG/1
200 CAPSULE ORAL 3 TIMES DAILY
Qty: 42 CAPSULE | Refills: 0 | Status: SHIPPED | OUTPATIENT
Start: 2025-05-14 | End: 2025-05-28

## 2025-05-14 RX ADMIN — NICOTINE POLACRILEX 4 MG: 2 GUM, CHEWING BUCCAL at 05:13

## 2025-05-14 RX ADMIN — BENZOCAINE AND MENTHOL 1 LOZENGE: 15; 3.6 LOZENGE ORAL at 05:14

## 2025-05-14 RX ADMIN — BENZONATATE 200 MG: 100 CAPSULE ORAL at 08:24

## 2025-05-14 RX ADMIN — FAMOTIDINE 40 MG: 20 TABLET, FILM COATED ORAL at 08:24

## 2025-05-14 RX ADMIN — NICOTINE POLACRILEX 2 MG: 2 GUM, CHEWING BUCCAL at 08:30

## 2025-05-14 RX ADMIN — GUAIFENESIN 600 MG: 600 TABLET, EXTENDED RELEASE ORAL at 08:25

## 2025-05-14 RX ADMIN — IPRATROPIUM BROMIDE AND ALBUTEROL SULFATE 3 ML: .5; 3 SOLUTION RESPIRATORY (INHALATION) at 10:03

## 2025-05-14 RX ADMIN — CEFUROXIME AXETIL 250 MG: 250 TABLET, FILM COATED ORAL at 08:25

## 2025-05-14 RX ADMIN — PREDNISONE 40 MG: 20 TABLET ORAL at 08:25

## 2025-05-14 RX ADMIN — OXYCODONE 5 MG: 5 TABLET ORAL at 05:54

## 2025-05-14 RX ADMIN — IBUPROFEN 600 MG: 600 TABLET ORAL at 08:30

## 2025-05-14 RX ADMIN — ENOXAPARIN SODIUM 40 MG: 40 INJECTION SUBCUTANEOUS at 08:25

## 2025-05-14 RX ADMIN — AZITHROMYCIN DIHYDRATE 500 MG: 250 TABLET ORAL at 08:25

## 2025-05-14 RX ADMIN — LORAZEPAM 0.5 MG: 0.5 TABLET ORAL at 05:13

## 2025-05-14 RX ADMIN — OXYCODONE 5 MG: 5 TABLET ORAL at 10:40

## 2025-05-14 RX ADMIN — GUAIFENESIN 200 MG: 200 SOLUTION ORAL at 05:13

## 2025-05-14 ASSESSMENT — PAIN SCALES - GENERAL
PAINLEVEL_OUTOF10: 8
PAINLEVEL_OUTOF10: 0 - NO PAIN
PAINLEVEL_OUTOF10: 0 - NO PAIN

## 2025-05-14 ASSESSMENT — PAIN - FUNCTIONAL ASSESSMENT: PAIN_FUNCTIONAL_ASSESSMENT: 0-10

## 2025-05-14 NOTE — DISCHARGE SUMMARY
Discharge Diagnosis  COPD exacerbation (Multi)  Community acquired bacterial pneumonia  Tobacco use           Issues Requiring Follow-Up      Discharge Meds     Medication List      START taking these medications     azithromycin 250 mg tablet; Commonly known as: Zithromax; Take 2 tabs   (500 mg) by mouth today, then take 1 tab daily for 4 days. Do not fill   before May 14, 2025.   cefuroxime 250 mg tablet; Commonly known as: Ceftin; Take 1 tablet (250   mg) by mouth 2 times a day for 12 doses.   * predniSONE 20 mg tablet; Commonly known as: Deltasone; Take 2 tablets   (40 mg) by mouth once daily for 3 doses.   * predniSONE 10 mg tablet; Commonly known as: Deltasone; Take 3 tablets   (30 mg) by mouth once daily for 3 doses. Do not fill before May 16, 2025.;   Start taking on: May 16, 2025   * predniSONE 20 mg tablet; Commonly known as: Deltasone; Take 1 tablet   (20 mg) by mouth once daily. Do not fill before May 19, 2025.; Start   taking on: May 19, 2025   * predniSONE 10 mg tablet; Commonly known as: Deltasone; Take 1 tablet   (10 mg) by mouth once daily for 3 doses. Do not fill before May 22, 2025.;   Start taking on: May 22, 2025  * This list has 4 medication(s) that are the same as other medications   prescribed for you. Read the directions carefully, and ask your doctor or   other care provider to review them with you.     CHANGE how you take these medications     amphetamine-dextroamphetamine XR 30 mg 24 hr capsule; Commonly known as:   Adderall XR; Take 1 capsule (30 mg) by mouth once daily. Do not fill   before May 2, 2025.; What changed: Another medication with the same name   was removed. Continue taking this medication, and follow the directions   you see here.   benzonatate 200 mg capsule; Commonly known as: Tessalon; Take 1 capsule   (200 mg) by mouth 3 times a day for 14 days. Do not crush or chew.; What   changed: when to take this, reasons to take this     CONTINUE taking these medications     *  albuterol 2.5 mg /3 mL (0.083 %) nebulizer solution   * albuterol 90 mcg/actuation inhaler; Inhale 2 puffs every 6 hours if   needed for wheezing.   famotidine 40 mg tablet; Commonly known as: Pepcid; Take 1 tablet (40   mg) by mouth once daily as needed for heartburn or indigestion.  * This list has 2 medication(s) that are the same as other medications   prescribed for you. Read the directions carefully, and ask your doctor or   other care provider to review them with you.     STOP taking these medications     cyclobenzaprine 10 mg tablet; Commonly known as: Flexeril   doxycycline 100 mg tablet; Commonly known as: Vibra-Tabs   Xiidra 5 % dropperette; Generic drug: lifitegrast       Test Results Pending At Discharge  Pending Labs       No current pending labs.         54 y.o. female presenting with a complaint of SOB. She presented to the ED with a persistent cough, SOB, generalized weakness, and decreased appetite for the past couple of weeks. She had a virtual visit with a provider and was placed o doxycycline and tessalon Perles. She is not feeling better and has a low grade fever. She denies chest pain, N/V/D/C.     In the ED:  VS: T 36.9; HR 90; R 16; /91; SpO2 99% on RA  Labs: Glu 86; Na 138; Cr 3.9; Bun 8cr 0.69; wBC 9.8; Hb 13.3; Hcrt 39.1;   Radiology: CXR No acute cardiopulmonary process. Increased conspicuity of a 1.1 cm ground-glass nodule in the right upper lobe compared to prior chest radiograph in which can be seen on CT chest 12/11/2024  Medication: Duoneb; LR 1,000mL,and stolumedrol  Dispistion: admitted to [facility]    Community acquired bacterial pneumonia  COPD exacerbation  - supplemental oxygen to keep SpO2 > 90%  - currently on RA  - No oxygen at home  - given azithromycin in the eD  - continue azithromycin 500 mg daily; day 3  - started ceftriaxone 2g daily; day 1- switched to cefuroxime   - started duoneb 0.5-2.5 mg TID  - started solumedrol 40 mg q8h - to  prednisone taper   - started guaifenesin 600 mg BID, tessalon perles 200 mg TID  - RT for bronchial hygiene  - CTA chest: negative for PE; Bandlike and reticular opacities   within the dependent bibasilar lower lobes      Chronic GERD  - continue famotidine 40 mg daily     Tobacco use  - smoking cessation offered  - started lorazepam 0.5 mg q8h  - patient allergic to the adhesive in the nicotine patch     DVT ppx  - started enoxaparin 40 mg daily     Code status: Full     Dispostion: Patient was stable to be discharged to home. She was discharged on cefuroxime and prednisone.    Seen and discussed with Dr. El MD     Total cumulative time spent in preparation of this discharge including documentation review, coordination of care with the medical team including PT/SW/care coordinators and treating consultants, discussion with patient and pertinent family members and finalization of prescriptions, follow-up appointments, and this discharge summary was approximately 45 minutes.     Pertinent Physical Exam At Time of Discharge  Physical Exam  Vitals reviewed.   Constitutional:       Appearance: Normal appearance. She is normal weight.   HENT:      Head: Normocephalic and atraumatic.      Right Ear: External ear normal.      Left Ear: External ear normal.      Nose: Nose normal.      Mouth/Throat:      Mouth: Mucous membranes are moist.      Pharynx: Oropharynx is clear.   Eyes:      Conjunctiva/sclera: Conjunctivae normal.   Cardiovascular:      Rate and Rhythm: Normal rate and regular rhythm.      Pulses: Normal pulses.      Heart sounds: Normal heart sounds.   Pulmonary:      Effort: Pulmonary effort is normal.      Breath sounds: Normal breath sounds.   Abdominal:      General: Bowel sounds are normal.      Palpations: Abdomen is soft.   Musculoskeletal:         General: Normal range of motion.      Cervical back: Normal range of motion and neck supple.   Skin:     General: Skin is warm and dry.   Neurological:       General: No focal deficit present.      Mental Status: She is alert and oriented to person, place, and time.   Psychiatric:         Mood and Affect: Mood normal.         Behavior: Behavior normal.         Outpatient Follow-Up  Future Appointments   Date Time Provider Department Center   5/16/2025  4:30 PM DO Genaro Pino   10/1/2025 11:00 AM DO Genaro Pino [facility]         Alexsandra Horton, ESTEPHANIA-CNP

## 2025-05-14 NOTE — CARE PLAN
The patient's goals for the shift include      The clinical goals for the shift include coughing to decrease    Discharged home. Reviewed medications. Agreed  with discharge.

## 2025-05-14 NOTE — PROGRESS NOTES
05/14/25 1006   Discharge Planning   Assistance Needed Patient is independent with ADL's and iADL's, denies the use of assistive devices. Drives and works. Denies financial difficulty.   Who is requesting discharge planning? Provider   Home or Post Acute Services None   Expected Discharge Disposition Home  (Denies further assistance upon discharge. Needs a return to work slip.)   Does the patient need discharge transport arranged? No  ( to )

## 2025-05-15 ENCOUNTER — PATIENT OUTREACH (OUTPATIENT)
Dept: PRIMARY CARE | Facility: CLINIC | Age: 55
End: 2025-05-15
Payer: COMMERCIAL

## 2025-05-15 LAB
ATRIAL RATE: 84 BPM
P AXIS: 67 DEGREES
P OFFSET: 198 MS
P ONSET: 151 MS
PR INTERVAL: 144 MS
Q ONSET: 223 MS
QRS COUNT: 14 BEATS
QRS DURATION: 64 MS
QT INTERVAL: 386 MS
QTC CALCULATION(BAZETT): 456 MS
QTC FREDERICIA: 431 MS
R AXIS: 13 DEGREES
T AXIS: 63 DEGREES
T OFFSET: 416 MS
VENTRICULAR RATE: 84 BPM

## 2025-05-15 NOTE — PROGRESS NOTES
Discharge Facility: [facility]  Discharge Diagnosis: COPD exacerbation, Community acquired bacterial pneumonia, Tobacco use  Admission Date: 5/11/2025  Discharge Date: 5/14/2025    PCP Appointment Date: Appointment with Lopez Segovia DO (05/16/2025) 4:30 PM  Specialist Appointment Date: TBD  Hospital Encounter and Summary Linked: Yes  ED to Hosp-Admission (Discharged) with Mich Gallegos MD; Jae Martinez MD (05/11/2025)     Two attempts were made to reach patient within two business days after discharge. Left voicemail with contact information for patient to call back with any non-emergent questions or concerns.

## 2025-05-16 ENCOUNTER — OFFICE VISIT (OUTPATIENT)
Dept: PRIMARY CARE | Facility: CLINIC | Age: 55
End: 2025-05-16
Payer: COMMERCIAL

## 2025-05-16 VITALS
TEMPERATURE: 98.2 F | SYSTOLIC BLOOD PRESSURE: 158 MMHG | WEIGHT: 187.4 LBS | BODY MASS INDEX: 27.76 KG/M2 | DIASTOLIC BLOOD PRESSURE: 80 MMHG | OXYGEN SATURATION: 97 % | HEART RATE: 54 BPM | HEIGHT: 69 IN

## 2025-05-16 DIAGNOSIS — J40 BRONCHITIS: Primary | ICD-10-CM

## 2025-05-16 DIAGNOSIS — J18.9 PNEUMONIA OF RIGHT LOWER LOBE DUE TO INFECTIOUS ORGANISM: ICD-10-CM

## 2025-05-16 DIAGNOSIS — M54.9 DORSALGIA: ICD-10-CM

## 2025-05-16 DIAGNOSIS — B37.9 YEAST INFECTION: ICD-10-CM

## 2025-05-16 PROCEDURE — 99213 OFFICE O/P EST LOW 20 MIN: CPT | Performed by: FAMILY MEDICINE

## 2025-05-16 PROCEDURE — 3008F BODY MASS INDEX DOCD: CPT | Performed by: FAMILY MEDICINE

## 2025-05-16 RX ORDER — OXYCODONE AND ACETAMINOPHEN 5; 325 MG/1; MG/1
1 TABLET ORAL EVERY 6 HOURS PRN
Qty: 12 TABLET | Refills: 0 | Status: SHIPPED | OUTPATIENT
Start: 2025-05-16 | End: 2025-05-19

## 2025-05-16 RX ORDER — FLUCONAZOLE 150 MG/1
150 TABLET ORAL
Qty: 2 TABLET | Refills: 1 | Status: SHIPPED | OUTPATIENT
Start: 2025-05-16

## 2025-05-16 ASSESSMENT — ENCOUNTER SYMPTOMS
DIARRHEA: 0
ENDOCRINE NEGATIVE: 1
SHORTNESS OF BREATH: 0
DIZZINESS: 0
NAUSEA: 0
DIFFICULTY URINATING: 0
FEVER: 0

## 2025-05-16 ASSESSMENT — PATIENT HEALTH QUESTIONNAIRE - PHQ9
2. FEELING DOWN, DEPRESSED OR HOPELESS: NOT AT ALL
SUM OF ALL RESPONSES TO PHQ9 QUESTIONS 1 AND 2: 0
1. LITTLE INTEREST OR PLEASURE IN DOING THINGS: NOT AT ALL

## 2025-05-16 ASSESSMENT — PAIN SCALES - GENERAL: PAINLEVEL_OUTOF10: 7

## 2025-05-16 NOTE — PROGRESS NOTES
"Subjective   Patient ID: Angélica Daniel is a 54 y.o. female who presents for Hospital Follow-up ([facility]-Sunday night coughing and choking for 2 weeks, Dx COPD exacerbation and pneumonia).    HPI   The pt presents to the clinic for an ER follow-up. Past medical hx of chronic GERD, anxiety, ADD, asthma, COPD exacerbation, and tobacco use.    -    Review of Systems   Constitutional:  Negative for fever.        Also see HPI   Eyes:  Negative for visual disturbance.   Respiratory:  Negative for shortness of breath.    Cardiovascular:  Negative for chest pain.   Gastrointestinal:  Negative for diarrhea and nausea.   Endocrine: Negative.    Genitourinary:  Negative for difficulty urinating.   Skin:  Negative for rash.   Neurological:  Negative for dizziness.        No focal deficits   Psychiatric/Behavioral:  Negative for suicidal ideas.    All other systems reviewed and are negative.      Objective   /80   Pulse 54   Temp 36.8 °C (98.2 °F)   Ht 1.753 m (5' 9\")   Wt 85 kg (187 lb 6.4 oz)   SpO2 97%   BMI 27.67 kg/m²     Physical Exam  Vitals and nursing note reviewed.   Constitutional:       Appearance: Normal appearance.   HENT:      Head: Normocephalic and atraumatic.   Eyes:      Conjunctiva/sclera: Conjunctivae normal.   Cardiovascular:      Rate and Rhythm: Normal rate and regular rhythm.      Heart sounds: Normal heart sounds.   Pulmonary:      Effort: Pulmonary effort is normal.      Breath sounds: Normal breath sounds.   Neurological:      Mental Status: She is oriented to person, place, and time.   Psychiatric:         Mood and Affect: Mood normal.         Behavior: Behavior normal.         Assessment/Plan   There are no diagnoses linked to this encounter.       Scribe Attestation  By signing my name below, ITaran Scribe   attest that this documentation has been prepared under the direction and in the presence of Lopez Segovia DO.    " for this visit:  Bronchitis  Yeast infection  -     fluconazole (Diflucan) 150 mg tablet; Take 1 tablet (150 mg) by mouth every 3rd day if needed (yeast infection).  Dorsalgia  -     oxyCODONE-acetaminophen (Percocet) 5-325 mg tablet; Take 1 tablet by mouth every 6 hours if needed (pain) for up to 3 days.  Pneumonia of right lower lobe due to infectious organism  -     CBC and Auto Differential; Future  -     Comprehensive Metabolic Panel; Future         Scribe Attestation  By signing my name below, I, Fadi Wheeler   attest that this documentation has been prepared under the direction and in the presence of Lopez Segovia DO.

## 2025-05-20 ENCOUNTER — TELEPHONE (OUTPATIENT)
Dept: PRIMARY CARE | Facility: CLINIC | Age: 55
End: 2025-05-20
Payer: COMMERCIAL

## 2025-05-20 NOTE — TELEPHONE ENCOUNTER
Patient called with update to last weeks appt - pneumonia and COPD.  Patient went to [facility] yesterday - she still has SOB, and fatigued, she has not been able to do much.  She has gone down to 7 cigarettes a day.   She says she was to call back today and you were to advise when she should complete the CMP and CBC.  She is on Prednisone - do you want her to complete the Prednisone before doing the labs?  She states she is not going back to work until next Tuesday - if she is able.   Please advise.

## 2025-05-22 DIAGNOSIS — J44.1 COPD EXACERBATION (MULTI): ICD-10-CM

## 2025-05-22 DIAGNOSIS — J45.909 PERSISTENT ASTHMA WITHOUT COMPLICATION, UNSPECIFIED ASTHMA SEVERITY (HHS-HCC): Primary | ICD-10-CM

## 2025-05-23 DIAGNOSIS — R41.840 CONCENTRATION DEFICIT: ICD-10-CM

## 2025-05-23 NOTE — LETTER
May 23, 2025     Patient: Angélica Daniel   YOB: 1970   Date of Visit: 5/23/2025       To Whom It May Concern:    Angélica Daniel was seen in my clinic on 5/23/2025 following her hospitalization on 5/11/25.  Please excuse Angélica for her absence from work from 5/11/2025 through 5/26/2025.  She may return to work on Tuesday 5/27/2025.      If you have any questions or concerns, please don't hesitate to call.         Sincerely,         Lopez Segovia D.O        CC: No Recipients

## 2025-06-04 RX ORDER — DEXTROAMPHETAMINE SACCHARATE, AMPHETAMINE ASPARTATE MONOHYDRATE, DEXTROAMPHETAMINE SULFATE AND AMPHETAMINE SULFATE 7.5; 7.5; 7.5; 7.5 MG/1; MG/1; MG/1; MG/1
30 CAPSULE, EXTENDED RELEASE ORAL DAILY
Qty: 30 CAPSULE | Refills: 0 | Status: SHIPPED | OUTPATIENT
Start: 2025-07-03 | End: 2025-08-02

## 2025-06-04 RX ORDER — DEXTROAMPHETAMINE SACCHARATE, AMPHETAMINE ASPARTATE MONOHYDRATE, DEXTROAMPHETAMINE SULFATE AND AMPHETAMINE SULFATE 7.5; 7.5; 7.5; 7.5 MG/1; MG/1; MG/1; MG/1
30 CAPSULE, EXTENDED RELEASE ORAL DAILY
Qty: 30 CAPSULE | Refills: 0 | Status: SHIPPED | OUTPATIENT
Start: 2025-06-04 | End: 2025-07-04

## 2025-06-04 RX ORDER — DEXTROAMPHETAMINE SACCHARATE, AMPHETAMINE ASPARTATE MONOHYDRATE, DEXTROAMPHETAMINE SULFATE AND AMPHETAMINE SULFATE 7.5; 7.5; 7.5; 7.5 MG/1; MG/1; MG/1; MG/1
30 CAPSULE, EXTENDED RELEASE ORAL DAILY
Qty: 30 CAPSULE | Refills: 0 | Status: SHIPPED | OUTPATIENT
Start: 2025-08-02 | End: 2025-09-01

## 2025-06-04 NOTE — TELEPHONE ENCOUNTER
LOV:    5/16       NEXT OV:    10/01                        LAST FILL:   5/2                        LABS:    urine drug test   overdue

## 2025-06-06 ENCOUNTER — PATIENT OUTREACH (OUTPATIENT)
Dept: PRIMARY CARE | Facility: CLINIC | Age: 55
End: 2025-06-06
Payer: COMMERCIAL

## 2025-06-06 NOTE — PROGRESS NOTES
Unable to reach patient for post discharge follow up. Previous outreach attempt also failed. Left voicemail with call back number for patient to call if needed. TCM program closed. Will reopen if patient response received.

## 2025-07-23 ENCOUNTER — OFFICE VISIT (OUTPATIENT)
Dept: PRIMARY CARE | Facility: CLINIC | Age: 55
End: 2025-07-23
Payer: COMMERCIAL

## 2025-07-23 ENCOUNTER — APPOINTMENT (OUTPATIENT)
Dept: URGENT CARE | Age: 55
End: 2025-07-23
Payer: COMMERCIAL

## 2025-07-23 VITALS
SYSTOLIC BLOOD PRESSURE: 130 MMHG | DIASTOLIC BLOOD PRESSURE: 80 MMHG | TEMPERATURE: 98.4 F | HEIGHT: 69 IN | WEIGHT: 186 LBS | BODY MASS INDEX: 27.55 KG/M2 | HEART RATE: 85 BPM | OXYGEN SATURATION: 98 %

## 2025-07-23 DIAGNOSIS — J01.00 ACUTE NON-RECURRENT MAXILLARY SINUSITIS: Primary | ICD-10-CM

## 2025-07-23 DIAGNOSIS — B37.9 YEAST INFECTION: ICD-10-CM

## 2025-07-23 PROCEDURE — 3008F BODY MASS INDEX DOCD: CPT | Performed by: FAMILY MEDICINE

## 2025-07-23 PROCEDURE — 99213 OFFICE O/P EST LOW 20 MIN: CPT | Performed by: FAMILY MEDICINE

## 2025-07-23 RX ORDER — FLUCONAZOLE 150 MG/1
150 TABLET ORAL
Qty: 2 TABLET | Refills: 1 | Status: SHIPPED | OUTPATIENT
Start: 2025-07-23

## 2025-07-23 RX ORDER — DOXYCYCLINE 100 MG/1
100 CAPSULE ORAL 2 TIMES DAILY
Qty: 20 CAPSULE | Refills: 0 | Status: SHIPPED | OUTPATIENT
Start: 2025-07-23 | End: 2025-08-02

## 2025-07-23 RX ORDER — ALBUTEROL SULFATE 0.83 MG/ML
2.5 SOLUTION RESPIRATORY (INHALATION) EVERY 6 HOURS PRN
Qty: 75 ML | Refills: 1 | Status: SHIPPED | OUTPATIENT
Start: 2025-07-23

## 2025-07-23 ASSESSMENT — ENCOUNTER SYMPTOMS
NAUSEA: 0
DIZZINESS: 0
RHINORRHEA: 1
SINUS PRESSURE: 1
SINUS PAIN: 1
DIFFICULTY URINATING: 0
SHORTNESS OF BREATH: 0
DIARRHEA: 0
ENDOCRINE NEGATIVE: 1
FEVER: 0

## 2025-07-23 ASSESSMENT — PAIN SCALES - GENERAL: PAINLEVEL_OUTOF10: 4

## 2025-07-23 NOTE — PROGRESS NOTES
"Subjective   Patient ID: Angélica Daniel is a 55 y.o. female who presents for sinus pressure, ear pain    HPI   Patient is presenting to the clinic for sinus pressure and ear pain    Sinus symptoms, ear pain for 3 days  Yeast infection treated with fluconazole    Review of Systems   Constitutional:  Negative for fever.        Also see HPI   HENT:  Positive for congestion, ear pain, postnasal drip, rhinorrhea, sinus pressure and sinus pain.    Eyes:  Negative for visual disturbance.   Respiratory:  Negative for shortness of breath.    Cardiovascular:  Negative for chest pain.   Gastrointestinal:  Negative for diarrhea and nausea.   Endocrine: Negative.    Genitourinary:  Negative for difficulty urinating.   Skin:  Negative for rash.   Neurological:  Negative for dizziness.        No focal deficits   Psychiatric/Behavioral:  Negative for suicidal ideas.    All other systems reviewed and are negative.      Objective   /80   Pulse 85   Temp 36.9 °C (98.4 °F)   Ht 1.753 m (5' 9\")   Wt 84.4 kg (186 lb)   SpO2 98%   BMI 27.47 kg/m²     Physical Exam  Vitals and nursing note reviewed.   Constitutional:       Appearance: Normal appearance.   HENT:      Head: Normocephalic and atraumatic.     Eyes:      Conjunctiva/sclera: Conjunctivae normal.       Cardiovascular:      Rate and Rhythm: Normal rate and regular rhythm.      Heart sounds: Normal heart sounds.   Pulmonary:      Effort: Pulmonary effort is normal.      Breath sounds: Normal breath sounds.     Neurological:      Mental Status: She is oriented to person, place, and time.     Psychiatric:         Mood and Affect: Mood normal.         Behavior: Behavior normal.         Assessment/Plan   Diagnoses and all orders for this visit:  Acute non-recurrent maxillary sinusitis  -     albuterol 2.5 mg /3 mL (0.083 %) nebulizer solution; Take 3 mL (2.5 mg) by nebulization every 6 hours if needed for wheezing.  -     doxycycline (Vibramycin) 100 mg capsule; Take 1 " capsule (100 mg) by mouth 2 times a day for 10 days. Take with at least 8 ounces (large glass) of water, do not lie down for 30 minutes after  Yeast infection  -     fluconazole (Diflucan) 150 mg tablet; Take 1 tablet (150 mg) by mouth every 3rd day if needed (yeast infection).       Scribe Attestation  By signing my name below, IShelly Scribakhil   attest that this documentation has been prepared under the direction and in the presence of Lopez Segovia DO.